# Patient Record
Sex: FEMALE | Race: WHITE | Employment: OTHER | URBAN - METROPOLITAN AREA
[De-identification: names, ages, dates, MRNs, and addresses within clinical notes are randomized per-mention and may not be internally consistent; named-entity substitution may affect disease eponyms.]

---

## 2020-06-16 ENCOUNTER — TRANSFERRED RECORDS (OUTPATIENT)
Dept: HEALTH INFORMATION MANAGEMENT | Facility: CLINIC | Age: 25
End: 2020-06-16

## 2020-06-16 LAB
HBV SURFACE AG SERPL QL IA: NEGATIVE
HIV 1+2 AB+HIV1 P24 AG SERPL QL IA: NON REACTIVE
RUBELLA ANTIBODY IGG QUANTITATIVE: NORMAL IU/ML
TREPONEMA ANTIBODIES: NON REACTIVE

## 2020-06-18 ENCOUNTER — MEDICAL CORRESPONDENCE (OUTPATIENT)
Dept: HEALTH INFORMATION MANAGEMENT | Facility: CLINIC | Age: 25
End: 2020-06-18

## 2020-06-18 ENCOUNTER — TRANSCRIBE ORDERS (OUTPATIENT)
Dept: MATERNAL FETAL MEDICINE | Facility: CLINIC | Age: 25
End: 2020-06-18

## 2020-06-18 DIAGNOSIS — O26.90 PREGNANCY RELATED CONDITION: Primary | ICD-10-CM

## 2020-06-19 ENCOUNTER — TELEPHONE (OUTPATIENT)
Dept: MATERNAL FETAL MEDICINE | Facility: CLINIC | Age: 25
End: 2020-06-19

## 2020-06-19 DIAGNOSIS — O09.291 HX OF PREECLAMPSIA, PRIOR PREGNANCY, CURRENTLY PREGNANT, FIRST TRIMESTER: ICD-10-CM

## 2020-06-19 DIAGNOSIS — O09.291 HX OF CERCLAGE, CURRENTLY PREGNANT, FIRST TRIMESTER: Primary | ICD-10-CM

## 2020-06-19 DIAGNOSIS — Z98.890 HX OF CERCLAGE, CURRENTLY PREGNANT, FIRST TRIMESTER: Primary | ICD-10-CM

## 2020-06-19 NOTE — TELEPHONE ENCOUNTER
Call to Paulie regarding referral to M. Previous hx in Care Everywhere. Wolfgang name Navya. Will have charts merged.    Sully Peres RN

## 2020-07-07 ENCOUNTER — OFFICE VISIT (OUTPATIENT)
Dept: MATERNAL FETAL MEDICINE | Facility: CLINIC | Age: 25
End: 2020-07-07
Attending: OBSTETRICS & GYNECOLOGY
Payer: COMMERCIAL

## 2020-07-07 ENCOUNTER — HOSPITAL ENCOUNTER (OUTPATIENT)
Dept: LAB | Facility: CLINIC | Age: 25
End: 2020-07-07
Attending: OBSTETRICS & GYNECOLOGY
Payer: COMMERCIAL

## 2020-07-07 ENCOUNTER — PREP FOR PROCEDURE (OUTPATIENT)
Dept: MATERNAL FETAL MEDICINE | Facility: CLINIC | Age: 25
End: 2020-07-07

## 2020-07-07 ENCOUNTER — HOSPITAL ENCOUNTER (OUTPATIENT)
Dept: ULTRASOUND IMAGING | Facility: CLINIC | Age: 25
End: 2020-07-07
Attending: OBSTETRICS & GYNECOLOGY
Payer: COMMERCIAL

## 2020-07-07 DIAGNOSIS — O09.291 HX OF CERCLAGE, CURRENTLY PREGNANT, FIRST TRIMESTER: ICD-10-CM

## 2020-07-07 DIAGNOSIS — O09.291 HX OF INCOMPETENT CERVIX, CURRENTLY PREGNANT, FIRST TRIMESTER: Primary | ICD-10-CM

## 2020-07-07 DIAGNOSIS — O09.291 HX OF PREECLAMPSIA, PRIOR PREGNANCY, CURRENTLY PREGNANT, FIRST TRIMESTER: ICD-10-CM

## 2020-07-07 DIAGNOSIS — Z36.9 PRENATAL SCREENING ENCOUNTER: ICD-10-CM

## 2020-07-07 DIAGNOSIS — Z11.59 ENCOUNTER FOR SCREENING FOR OTHER VIRAL DISEASES: ICD-10-CM

## 2020-07-07 DIAGNOSIS — Z98.890 HX OF CERCLAGE, CURRENTLY PREGNANT, FIRST TRIMESTER: ICD-10-CM

## 2020-07-07 DIAGNOSIS — Z36.9 PRENATAL SCREENING ENCOUNTER: Primary | ICD-10-CM

## 2020-07-07 DIAGNOSIS — O34.31 INCOMPETENT CERVIX IN PREGNANCY, ANTEPARTUM, FIRST TRIMESTER: Primary | ICD-10-CM

## 2020-07-07 PROCEDURE — 40000791 ZZHCL STATISTIC VERIFI PRENATAL TRISOMY 21,18,13: Performed by: OBSTETRICS & GYNECOLOGY

## 2020-07-07 PROCEDURE — 36415 COLL VENOUS BLD VENIPUNCTURE: CPT | Performed by: OBSTETRICS & GYNECOLOGY

## 2020-07-07 PROCEDURE — 96040 ZZH GENETIC COUNSELING, EACH 30 MINUTES: CPT | Mod: ZF | Performed by: GENETIC COUNSELOR, MS

## 2020-07-07 PROCEDURE — 76813 OB US NUCHAL MEAS 1 GEST: CPT

## 2020-07-07 NOTE — PROGRESS NOTES
"Maternal Fetal Medicine Consultation    I was asked by Dr. Puja Downing to see Paulie Careyjosiane for a consult for presumed incompetent cervix.     The patient is a 25 yo  at 11w4d. In 2016 she was noted to have a short cervix at a 20 week ultrasound. The cervix ultimately was 5 mm long and she was scheduled for a cerclage. At the time of the cerclage she was 2 cm dilated with bulging membranes at the external os. She subsequently delivered at 37 weeks by IOL for preeclampsia. She did not require and anti-hypertensive after delivery.     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 She has had not other surgeries. She has no other medical illnesses. No h/o STDs. No history of cervical surgery.    The patient history suggest the diagnosis of incompetent cervix. She was offered the option of prophylactic cerclage at 13-14 weeks or serial cervical length screening with a cerclage reserved for cervical shortening. The patient strongly desires a prophylactic cerclage and we will schedule this at 13-14 weeks. We would only recommend to assess the cervix after cerclage at the time of her anatomic survey. The patient is not a candidate for 17-OH-P.    For the history of preeclampsia with a term delivery we recommend to begin ASA 81 mg at 13 weeks. We also recommend more frequently office visits/BP assessments after 28 weeks.     Total time spent in face-to-face counseling was 15 minutes (>50% for medical management discussion and plan of care). A copy of this consult was sent to your office.    Please see \"Imaging\" tab under \"Chart Review\" for details of today's US at the [facility].    Star Terrell MD  Maternal-Fetal Medicine    "

## 2020-07-07 NOTE — PROGRESS NOTES
[facility]  Genetic Counseling Consult    Patient: Dulce Maria Mello YOB: 1995   Date of Service: 20      Dulce Maria Mello was seen at [facility] for genetic consultation to discuss the options for routine screening for fetal chromosome abnormalities.       Impression/Plan:   1.  Dulce Maria had an ultrasound and blood draw for NIPT (Innatal test through [facility]).Dulce Maria initially consented for first trimester screening, however, the NT was unattainable and proceeded with NIPT instead. Results are expected within 7-10 days, and will be available in [employer].  We will contact her to discuss the results, and a copy will be forwarded to the office of the referring OB provider. Dulce Maria Mello provided verbal permission for results to be left on her voicemail. She requested the results do include the sex.     2. Maternal serum AFP (single marker screen) is recommended after 15 weeks to screen for open neural tube defects. A quad screen should not be performed.    3. Dulce Maria had a Maternal Fetal Medicine consultation with Dr. Terrell regarding recommendations for a prophylactic cerclage. Please see the consultation note for more details     Pregnancy History:   /Parity:     Age at Delivery: 25 year old  LIAT: 2021, by Last Menstrual Period  Gestational Age: 11w4d    No significant complications or exposures were reported in the current pregnancy.    Dulce Maria serrano pregnancy history is significant for one full term pregnancy with cervical dilation at 20 weeks requiring a rescue cerclage at 22w and delivery at 37 weeks by induction     Medical History:   Dulce Maria serrano reported medical history is not expected to impact pregnancy management or risks to fetal development.       Family History:   A three-generation pedigree was obtained, and is scanned under the  Media  tab.   The following significant  "findings were reported by Dulce Maria:    The father of the pregnancy, Justice, 26, is healthy.       Dulce Maria's paternal grandmother  in her 40s, possibly due to cancer, with the type being unknown. Without more information it is difficult to offer an accurate risk assessment. However, reassuringly, the remaining family history is negative for cancer.       Justice's brother, 18, was born with normal hearing but had progressive hearing loss onset at age 2. Per Dulce Maria's report, Justice also had numerous ear infections with complications. His brother is otherwise healthy with normal vision. Dulce Maria believes he had a genetic work-up and although \"they did not find an answer they think Justice's parents were both carriers for something\". We discussed Justice could therefore be a carrier for the same condition. Dulce Maria's family history is negative for hearing loss, therefore, it unlikely she is a carrier for a similar condition. Carrier screening for genes/conditions like GJB2/GJB6 was offered and declined. We also discussed the  screening hearing test.     We reviewed that hearing loss is relatively common in the human population in that profound congenital hearing loss is estimated to occur in about 1 in 1000 births.  Approximately half of cases are thought to be due to environmental factors (acoustic trauma, ototoxic drug exposure, and bacterial or viral infections such as rubella or cytomegalovirus) and half due to genetic causes. The majority (70%) of cases of congenital deafness associated with genetic factors are classified as non-syndromic (the deafness is not associated with other clinical findings that define a recognized syndrome). In the remaining 30%, one of more than 400 forms of syndromic deafness can be diagnosed because of other associated clinical findings. It is difficult to determine the recurrence risk for family members without a known cause for the hearing loss. Inheritance patterns of " genetic hearing loss can include AD, AR, X-linked and mitochondrial.      Otherwise, the reported family history is negative for multiple miscarriages, stillbirths, birth defects, mental retardation, known genetic conditions, and consanguinity.       Carrier Screening:   The patient reports that the father of the pregnancy has  ancestry:      Cystic fibrosis is an autosomal recessive genetic condition that occurs with increased frequency in individuals of  ancestry and carrier screening for this condition is available.  In addition,  screening in the [facility] includes cystic fibrosis.    The patient reports that she is of [place] ancestry:     She is not aware of any Ashkenazi Scientologist heritage     Expanded carrier screening for mutations in a large panel of genes associated with autosomal recessive conditions including cystic fibrosis, spinal muscular atrophy, and others, is now available.      The patient has declined the carrier screening options reviewed today.       Risk Assessment for Chromosome Conditions:   We explained that the risk for fetal chromosome abnormalities increases with maternal age. We discussed specific features of common chromosome abnormalities, including Down syndrome, trisomy 13, trisomy 18, and sex chromosome trisomies.      At age 25 at midtrimester, the risk to have a baby with Down syndrome is 1 in 1040.    At age 25 at midtrimester, the risk to have a baby with any chromosome abnormality is 1 in 520.     Testing Options:   We discussed the following options:   First trimester screening    First trimester ultrasound with nuchal translucency and nasal bone assessments, maternal plasma hCG, MATA-A, and AFP measurement    Screens for fetal trisomy 21, trisomy 13, and trisomy 18    Cannot screen for open neural tube defects; maternal serum AFP after 15 weeks is recommended     Non-invasive Prenatal Testing (NIPT)    Maternal plasma cell-free DNA testing;  first trimester ultrasound with nuchal translucency and nasal bone assessment is recommended, when appropriate    Screens for fetal trisomy 21, trisomy 13, trisomy 18, and sex chromosome aneuploidy    Cannot screen for open neural tube defects; maternal serum AFP after 15 weeks is recommended     Chorionic villus sampling (CVS)    Invasive procedure typically performed in the first trimester by which placental villi are obtained for the purpose of chromosome analysis and/or other prenatal genetic analysis    Diagnostic results; >99% sensitivity for fetal chromosome abnormalities    Cannot test for open neural tube defects; maternal serum AFP after 15 weeks is recommended     Genetic Amniocentesis    Invasive procedure typically performed in the second trimester by which amniotic fluid is obtained for the purpose of chromosome analysis and/or other prenatal genetic analysis    Diagnostic results; >99% sensitivity for fetal chromosome abnormalities    AFAFP measurement tests for open neural tube defects     Comprehensive (Level II) ultrasound: Detailed ultrasound performed between 18-22 weeks gestation to screen for major birth defects and markers for aneuploidy.      We reviewed the benefits and limitations of this testing.  Screening tests provide a risk assessment specific to the pregnancy for certain fetal chromosome abnormalities, but cannot definitively diagnose or exclude a fetal chromosome abnormality.  Follow-up genetic counseling and consideration of diagnostic testing is recommended with any abnormal screening result.      It was a pleasure to be involved with Dulce Maria s care. Face-to-face time of the meeting was 45 minutes.  Sheyla Casas MS, [facility]  Licensed Genetic Counselor   [facility]  Maternal Fetal Medicine  [email or web address]  [email or web address]  Office: [phone]  Pager [phone]  MFM: [phone]   Fax: [phone]

## 2020-07-08 ENCOUNTER — TELEPHONE (OUTPATIENT)
Dept: MATERNAL FETAL MEDICINE | Facility: CLINIC | Age: 25
End: 2020-07-08

## 2020-07-08 PROBLEM — O09.291: Status: ACTIVE | Noted: 2020-07-08

## 2020-07-08 NOTE — TELEPHONE ENCOUNTER
Phone call to Dulce Maria regarding cerclage scheduled for 7/21/20 at 0830 at [facility]. Pt aware she needs to arrive by 0630. Address for [facility], parking, preparing for your cerclage info sheet, covid testing info, preparing for surgery form, and NPO status all reviewed via phone and emailed to patient. Order placed for covid test.       Sully Peres RN

## 2020-07-13 ENCOUNTER — TELEPHONE (OUTPATIENT)
Dept: MATERNAL FETAL MEDICINE | Facility: CLINIC | Age: 25
End: 2020-07-13

## 2020-07-13 LAB — LAB SCANNED RESULT: NORMAL

## 2020-07-13 NOTE — TELEPHONE ENCOUNTER
Called and discussed normal NIPT results with Dulce Maria. Results indicate NO ANEUPLOIDY DETECTED for chromosomes 21, 18, 13, or sex chromosomes (XX). Sex was disclosed per patient's wishes.     This puts her current pregnancy at low risk for Down syndrome, trisomy 18, trisomy 13 and sex chromosome abnormalities. This test is reported to have the following sensitivities: Down syndrome- 99%, trisomy 18- 98%, and trisomy 13- 98%. Although these results are reassuring, this does not replace a standard chromosome analysis from a chorionic villus sampling or amniocentesis.     While this result is reassuring, it does not rule out all possible genetic conditions. There is not currently one single genetic test available that can assess for all possible genetic conditions.    Level II ultrasound is recommended, given AMA.     MSAFP is the appropriate second trimester screening test for open neural tube defects; the maternal quad screen is not recommended.    Her results will be faxed to her primary OB to review.    Dulce Maria is scheduled for a prophylactic cerclage on 07/21/2020.     Sheyla Casas MS, [facility]  Licensed Genetic Counselor   [facility]  Maternal Fetal Medicine  [email or web address]  [email or web address]  Office: [phone]  Pager [phone]  M: [phone]   Fax: [phone]

## 2020-07-17 NOTE — H&P
[facility] History and Physical      Dulce Maria Mello MRN# [id number]   Age: 25 year old YOB: 1995       HPI:  Ms. Dulce Maria Mello is a 25 year old  at 13w4d by LMP consistent with 8w4d US, who presents for scheduled history indicated cerclage.  In patient's prior pregnancy, cervix was noted to be 5 mm long on 20w US. She had a cerclage placed, and at the time of the procedure she was 2 cm dilated with bulging membranes. Subsequently patient delivered at 37 after IOL for preeclampsia.      She was seen in [facility] on . Patient was offered serial cervical length screenings or prophylactic cerclage. She opted for prophylactic cerclage at 13-14 weeks.    Today, she denies contractions, vaginal bleeding, and loss of fluid.       Pregnancy Complications:  - H/o preeclampsia, did not require anti-hypertensives at anytime  - H/o cervical insufficiency in prior pregnancy, requiring cerclage    Prenatal Labs:   Lab Results   Component Value Date    ABO O 2020    RH Pos 2020    AS Neg 2020    HGB 12.4 2020     COVID-19 negative    GBS Status:   No results found for: GBS     Ultrasounds  [facility] Nuchal Translucency 20:  Cardiac activity present.  Placenta posterior.  Amniotic fluid normal amount.  Impression: Sonographic biometry agrees with gestational age predicted by LMP. The nuchal translucency could not be assessed today due to fetal position.    OB History  G1:  at 37W, s/p cerclage placement for 5 mm cervix, 2 cm dialated with bulging membranes. 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 No pertinent family history.     SocHx: She denies any tobacco, alcohol, or other drug use during this pregnancy.    ROS:   She denies headache, blurry vision, chest pain, shortness of breath, RUQ pain, cough, sore throat, or extremity edema.    PE:  Vit:   Patient Vitals for the past 4 hrs:   BP Temp Temp src Pulse Resp Height Weight   20 0645 125/58 98.7  F (37.1  C) Oral 102 16 1.651 m (5' 5\") 97.5 kg (215 lb)      Gen: Well-appearing, NAD, comfortable   CV: Well perfused   Pulm: Non-labored breathing  Abd: Soft, gravid, non-tender  Ext: Trace LE edema b/l    Doptones: 150 bpm      Assessment/Plan  Dulce Maria Mello is a 25 year old  female at 13w4d by 8w4d US here for scheduled history indicated cerclage.     #Hx of shortened cervix  #Hx of 2nd trimester painless cervical dilation  #Hx of cerclage placement  - Discussed risks of a cerclage including but not limited to: bleeding (including placental), infection (including chorioamnionitis), damage to surrounding structures including but not limited to bowel, bladder, cervix, risk of PPROM, failure of cerclage to prevent pre-viable, clarence-viable or  birth, possible increased need for  delivery.  - Doptones before and after the procedure  - Surgical consent signed.   - Anesthesia alerted, patient NPO since midnight. Plan spinal. No plans for antibiotics unless membranes visualized.    The patient was discussed with Dr. Castillo who is in agreement with the treatment plan.    Leola Rogers, MS4  [phone]    Resident/Fellow Attestation   I, Trina Gipson, was present with the medical student who participated in the service and in the documentation of the note.  I have verified the history and personally performed the physical exam and medical decision making.  I " agree with the assessment and plan of care as documented in the note.      25 y.o.  at 13w4d here for history-indicated cerclage. H/o 5 mm cervix at 20 weeks in prior pregnancy with [place] cerclage placement. Was induced at 37 weeks for preeclampsia. Counseled on options and elects prophylactic cerclage this pregnancy. Plan spinal. Anesthesia consult. No abx. Doppler tones prior to and after procedure.    Trina Gipson MD  OB/GYN Resident, PGY-3  2020 9:17 AM     Physician Attestation   I, Rocco Castillo MD, saw this patient with the resident and agree with the resident/fellow's findings and plan of care as documented in the note.      I personally reviewed vital signs, medications, labs and imaging.    Key findings: In summary, Dulce Maria Mello is a  at 13w4d admitted for prophylactic cerclage placement.  Risks, benefits, alternatives and hopeful outcomes were reviewed with the patient.  Informed consent obtained and plan to proceed with prophylactic cerclage today.     Rocco Castillo MD  Date of Service (when I saw the patient): 20    Time Spent on this Encounter   I spent 30 minutes on the unit/floor managing the care of Dulce Maria Mello. Over 50% of my time was spent on the following:   - Counseling the patient and/or family regarding: prognosis, risks and benefits of treatment options, recommended follow-up and medical compliance  - Coordination of care with the: nurse and patient    In summary, Dulce Maria Mello is a  at 13w4d admitted for prophylactic cerclage placement.  Risks, benefits, alternatives and hopeful outcomes were reviewed with the patient.  Informed consent obtained and plan to proceed with prophylactic cerclage today.     Rocco Castillo MD

## 2020-07-18 ENCOUNTER — ANESTHESIA EVENT (OUTPATIENT)
Dept: OBGYN | Facility: CLINIC | Age: 25
End: 2020-07-18
Payer: COMMERCIAL

## 2020-07-18 NOTE — ANESTHESIA PREPROCEDURE EVALUATION
Anesthesia Pre-Procedure Evaluation    Patient: Dulce Maria Mello   MRN:     [id number] Gender:   female   Age:    25 year old :      1995        Preoperative Diagnosis: Hx of incompetent cervix, currently pregnant, first trimester [O09.291]   Procedure(s):  CERCLAGE, CERVIX, VAGINAL APPROACH     LABS:  CBC: No results found for: WBC, HGB, HCT, PLT  BMP: No results found for: NA, POTASSIUM, CHLORIDE, CO2, BUN, CR, GLC  COAGS: No results found for: PTT, INR, FIBR  POC: No results found for: BGM, HCG, HCGS  OTHER: No results found for: PH, LACT, A1C, RICHARD, PHOS, MAG, ALBUMIN, PROTTOTAL, ALT, AST, GGT, ALKPHOS, BILITOTAL, BILIDIRECT, LIPASE, AMYLASE, MILA, TSH, T4, T3, CRP, SED     Preop Vitals    BP Readings from Last 3 Encounters:   No data found for BP    Pulse Readings from Last 3 Encounters:   No data found for Pulse      Resp Readings from Last 3 Encounters:   No data found for Resp    SpO2 Readings from Last 3 Encounters:   No data found for SpO2      Temp Readings from Last 1 Encounters:   No data found for Temp    Ht Readings from Last 1 Encounters:   No data found for Ht      Wt Readings from Last 1 Encounters:   No data found for Wt    There is no height or weight on file to calculate BMI.     LDA:        Past Medical History:   Diagnosis Date     PCOS (polycystic ovarian syndrome)       Past Surgical History:   Procedure Laterality Date     CERCLAGE CERVICAL  2016      Allergies not on file     Anesthesia Evaluation     . Pt has had prior anesthetic. Type: Regional    No history of anesthetic complications          ROS/MED HX    ENT/Pulmonary:  - neg pulmonary ROS     Neurologic:  - neg neurologic ROS     Cardiovascular:  - neg cardiovascular ROS       METS/Exercise Tolerance:     Hematologic:  - neg hematologic  ROS       Musculoskeletal:  - neg musculoskeletal ROS       GI/Hepatic:  - neg GI/hepatic ROS       Renal/Genitourinary:     (+) Other Renal/ Genitourinary, Hx of incompetent  cervix      Endo:     (+) Other Endocrine Disorder PCOS.      Psychiatric:  - neg psychiatric ROS       Infectious Disease:         Malignancy:      - no malignancy   Other:    (+) Possibly pregnant                        PHYSICAL EXAM:   Mental Status/Neuro: A/A/O   Airway: Facies: Feasible  Mallampati: II  Mouth/Opening: Full  TM distance: > 6 cm  Neck ROM: Full   Respiratory: Auscultation: CTAB     Resp. Rate: Normal     Resp. Effort: Normal      CV: Rhythm: Regular  Heart: Normal Sounds   Comments:      Dental: Normal Dentition                Assessment:   ASA SCORE: 2      Smoking Status:  Non-Smoker/Unknown        Plan:   Anes. Type:  MAC; Spinal   Pre-Medication: None   Induction:  N/a   Airway: Native Airway   Access/Monitoring: PIV   Maintenance: N/a     Postop Plan:   Postop Pain: Regional  Postop Sedation/Airway: Not planned  Disposition: Inpatient/Admit     PONV Management:   Adult Risk Factors: Female, Non-Smoker   Prevention: Ondansetron                   Stacey Hairston MD

## 2020-07-19 DIAGNOSIS — Z11.59 ENCOUNTER FOR SCREENING FOR OTHER VIRAL DISEASES: ICD-10-CM

## 2020-07-19 LAB
LABORATORY COMMENT REPORT: NORMAL
SARS-COV-2 RNA SPEC QL NAA+PROBE: NEGATIVE
SARS-COV-2 RNA SPEC QL NAA+PROBE: NORMAL
SPECIMEN SOURCE: NORMAL
SPECIMEN SOURCE: NORMAL

## 2020-07-21 ENCOUNTER — HOSPITAL ENCOUNTER (OUTPATIENT)
Facility: CLINIC | Age: 25
End: 2020-07-21
Admitting: OBSTETRICS & GYNECOLOGY
Payer: COMMERCIAL

## 2020-07-21 ENCOUNTER — HOSPITAL ENCOUNTER (OUTPATIENT)
Facility: CLINIC | Age: 25
Discharge: HOME | End: 2020-07-21
Attending: OBSTETRICS & GYNECOLOGY | Admitting: OBSTETRICS & GYNECOLOGY
Payer: COMMERCIAL

## 2020-07-21 ENCOUNTER — ANESTHESIA (OUTPATIENT)
Dept: OBGYN | Facility: CLINIC | Age: 25
End: 2020-07-21
Payer: COMMERCIAL

## 2020-07-21 VITALS
HEART RATE: 86 BPM | OXYGEN SATURATION: 100 % | DIASTOLIC BLOOD PRESSURE: 73 MMHG | TEMPERATURE: 98.4 F | BODY MASS INDEX: 35.82 KG/M2 | SYSTOLIC BLOOD PRESSURE: 126 MMHG | HEIGHT: 65 IN | WEIGHT: 215 LBS | RESPIRATION RATE: 22 BRPM

## 2020-07-21 DIAGNOSIS — O09.291 HX OF INCOMPETENT CERVIX, CURRENTLY PREGNANT, FIRST TRIMESTER: ICD-10-CM

## 2020-07-21 PROBLEM — Z98.890 HISTORY OF CERVICAL CERCLAGE: Status: ACTIVE | Noted: 2020-07-21

## 2020-07-21 LAB
ERYTHROCYTE [DISTWIDTH] IN BLOOD BY AUTOMATED COUNT: 12.6 % (ref 10–15)
HCT VFR BLD AUTO: 36.8 % (ref 35–47)
HGB BLD-MCNC: 12.4 G/DL (ref 11.7–15.7)
MCH RBC QN AUTO: 30.3 PG (ref 26.5–33)
MCHC RBC AUTO-ENTMCNC: 33.7 G/DL (ref 31.5–36.5)
MCV RBC AUTO: 90 FL (ref 78–100)
PLATELET # BLD AUTO: 256 10E9/L (ref 150–450)
RBC # BLD AUTO: 4.09 10E12/L (ref 3.8–5.2)
WBC # BLD AUTO: 9.4 10E9/L (ref 4–11)

## 2020-07-21 PROCEDURE — 86850 RBC ANTIBODY SCREEN: CPT | Performed by: STUDENT IN AN ORGANIZED HEALTH CARE EDUCATION/TRAINING PROGRAM

## 2020-07-21 PROCEDURE — 25800030 ZZH RX IP 258 OP 636: Performed by: STUDENT IN AN ORGANIZED HEALTH CARE EDUCATION/TRAINING PROGRAM

## 2020-07-21 PROCEDURE — 25000128 H RX IP 250 OP 636: Performed by: STUDENT IN AN ORGANIZED HEALTH CARE EDUCATION/TRAINING PROGRAM

## 2020-07-21 PROCEDURE — 36000047 ZZH SURGERY LEVEL 1 EA 15 ADDTL MIN - UMMC: Performed by: OBSTETRICS & GYNECOLOGY

## 2020-07-21 PROCEDURE — 71000012 ZZH RECOVERY PHASE 1 LEVEL 1 FIRST HR: Performed by: OBSTETRICS & GYNECOLOGY

## 2020-07-21 PROCEDURE — 86900 BLOOD TYPING SEROLOGIC ABO: CPT | Performed by: STUDENT IN AN ORGANIZED HEALTH CARE EDUCATION/TRAINING PROGRAM

## 2020-07-21 PROCEDURE — 25000132 ZZH RX MED GY IP 250 OP 250 PS 637: Performed by: STUDENT IN AN ORGANIZED HEALTH CARE EDUCATION/TRAINING PROGRAM

## 2020-07-21 PROCEDURE — 36000045 ZZH SURGERY LEVEL 1 1ST 30 MIN - UMMC: Performed by: OBSTETRICS & GYNECOLOGY

## 2020-07-21 PROCEDURE — 37000008 ZZH ANESTHESIA TECHNICAL FEE, 1ST 30 MIN: Performed by: OBSTETRICS & GYNECOLOGY

## 2020-07-21 PROCEDURE — 36415 COLL VENOUS BLD VENIPUNCTURE: CPT | Performed by: STUDENT IN AN ORGANIZED HEALTH CARE EDUCATION/TRAINING PROGRAM

## 2020-07-21 PROCEDURE — 40000169 ZZH STATISTIC PRE-PROCEDURE ASSESSMENT I: Performed by: OBSTETRICS & GYNECOLOGY

## 2020-07-21 PROCEDURE — 86901 BLOOD TYPING SEROLOGIC RH(D): CPT | Performed by: STUDENT IN AN ORGANIZED HEALTH CARE EDUCATION/TRAINING PROGRAM

## 2020-07-21 PROCEDURE — 85027 COMPLETE CBC AUTOMATED: CPT | Performed by: STUDENT IN AN ORGANIZED HEALTH CARE EDUCATION/TRAINING PROGRAM

## 2020-07-21 PROCEDURE — 37000009 ZZH ANESTHESIA TECHNICAL FEE, EACH ADDTL 15 MIN: Performed by: OBSTETRICS & GYNECOLOGY

## 2020-07-21 PROCEDURE — 27210794 ZZH OR GENERAL SUPPLY STERILE: Performed by: OBSTETRICS & GYNECOLOGY

## 2020-07-21 RX ORDER — SODIUM CHLORIDE, SODIUM LACTATE, POTASSIUM CHLORIDE, CALCIUM CHLORIDE 600; 310; 30; 20 MG/100ML; MG/100ML; MG/100ML; MG/100ML
INJECTION, SOLUTION INTRAVENOUS
Status: DISCONTINUED
Start: 2020-07-21 | End: 2020-07-21 | Stop reason: HOSPADM

## 2020-07-21 RX ORDER — SODIUM CHLORIDE, SODIUM LACTATE, POTASSIUM CHLORIDE, CALCIUM CHLORIDE 600; 310; 30; 20 MG/100ML; MG/100ML; MG/100ML; MG/100ML
INJECTION, SOLUTION INTRAVENOUS CONTINUOUS PRN
Status: DISCONTINUED | OUTPATIENT
Start: 2020-07-21 | End: 2020-07-21

## 2020-07-21 RX ORDER — BUPIVACAINE HYDROCHLORIDE 7.5 MG/ML
INJECTION, SOLUTION INTRASPINAL PRN
Status: DISCONTINUED | OUTPATIENT
Start: 2020-07-21 | End: 2020-07-21

## 2020-07-21 RX ORDER — CITRIC ACID/SODIUM CITRATE 334-500MG
30 SOLUTION, ORAL ORAL ONCE
Status: COMPLETED | OUTPATIENT
Start: 2020-07-21 | End: 2020-07-21

## 2020-07-21 RX ORDER — SODIUM CHLORIDE, SODIUM LACTATE, POTASSIUM CHLORIDE, CALCIUM CHLORIDE 600; 310; 30; 20 MG/100ML; MG/100ML; MG/100ML; MG/100ML
INJECTION, SOLUTION INTRAVENOUS CONTINUOUS
Status: DISCONTINUED | OUTPATIENT
Start: 2020-07-21 | End: 2020-07-21 | Stop reason: HOSPADM

## 2020-07-21 RX ORDER — PRENATAL VIT/IRON FUM/FOLIC AC 27MG-0.8MG
1 TABLET ORAL DAILY
COMMUNITY

## 2020-07-21 RX ORDER — ONDANSETRON 2 MG/ML
4 INJECTION INTRAMUSCULAR; INTRAVENOUS EVERY 6 HOURS PRN
Status: DISCONTINUED | OUTPATIENT
Start: 2020-07-21 | End: 2020-07-21 | Stop reason: HOSPADM

## 2020-07-21 RX ORDER — ACETAMINOPHEN 325 MG/1
975 TABLET ORAL ONCE
Status: COMPLETED | OUTPATIENT
Start: 2020-07-21 | End: 2020-07-21

## 2020-07-21 RX ADMIN — BUPIVACAINE HYDROCHLORIDE IN DEXTROSE 1.3 ML: 7.5 INJECTION, SOLUTION SUBARACHNOID at 10:18

## 2020-07-21 RX ADMIN — SODIUM CHLORIDE, POTASSIUM CHLORIDE, SODIUM LACTATE AND CALCIUM CHLORIDE: 600; 310; 30; 20 INJECTION, SOLUTION INTRAVENOUS at 07:36

## 2020-07-21 RX ADMIN — SODIUM CITRATE AND CITRIC ACID MONOHYDRATE 30 ML: 500; 334 SOLUTION ORAL at 09:51

## 2020-07-21 RX ADMIN — SODIUM CHLORIDE, POTASSIUM CHLORIDE, SODIUM LACTATE AND CALCIUM CHLORIDE: 600; 310; 30; 20 INJECTION, SOLUTION INTRAVENOUS at 11:46

## 2020-07-21 RX ADMIN — ACETAMINOPHEN 975 MG: 325 TABLET, FILM COATED ORAL at 15:29

## 2020-07-21 RX ADMIN — SODIUM CHLORIDE, POTASSIUM CHLORIDE, SODIUM LACTATE AND CALCIUM CHLORIDE: 600; 310; 30; 20 INJECTION, SOLUTION INTRAVENOUS at 10:05

## 2020-07-21 ASSESSMENT — MIFFLIN-ST. JEOR: SCORE: 1721.11

## 2020-07-21 NOTE — ANESTHESIA CARE TRANSFER NOTE
Patient: Dulce Maria Riddering    Procedure(s):  CERCLAGE, CERVIX, VAGINAL APPROACH    Diagnosis: Hx of incompetent cervix, currently pregnant, first trimester [O09.291]  Diagnosis Additional Information: No value filed.    Anesthesia Type:   MAC, Spinal     Note:  Airway :Room Air  Patient transferred to:PACU  Comments: VSS. Breathing spontaneously at a regular rate with adequate tidal volumes and maintaining O2 sats on RA. Denies nausea or pain. No apparent complications from anesthesia.     Stacey Hairston MD [facility] pager [phone]  Anesthesia CA-2  Handoff Report: Identifed the Patient, Identified the Reponsible Provider, Reviewed the pertinent medical history, Discussed the surgical course, Reviewed Intra-OP anesthesia mangement and issues during anesthesia, Set expectations for post-procedure period and Allowed opportunity for questions and acknowledgement of understanding      Vitals: (Last set prior to Anesthesia Care Transfer)    CRNA VITALS  7/21/2020 1040 - 7/21/2020 1123      7/21/2020             Pulse:  94    SpO2:  100 %                Electronically Signed By: Stacey Hairston MD  July 21, 2020  11:23 AM

## 2020-07-21 NOTE — OR NURSING
Post Op Cerclage Note  Data:  Cerclage performed under spinal anesthesia.  2 stitches placed.  FHT's 160's, no contrations present.  minimal bleeding seen.  No LOF noted.  Interventions:  Continuous monitoring.  Continue IVF.    Plan:  Observe for contractions, bleeding, LOF.  Monitor fetal status.  Notify care provider of changes in condition.

## 2020-07-21 NOTE — ANESTHESIA POSTPROCEDURE EVALUATION
Anesthesia POST Procedure Evaluation    Patient: Dulce Maria Mello   MRN:     [id number] Gender:   female   Age:    25 year old :      1995        Preoperative Diagnosis: Hx of incompetent cervix, currently pregnant, first trimester [O09.291]   Procedure(s):  CERCLAGE, CERVIX, VAGINAL APPROACH   Postop Comments: No value filed.     Anesthesia Type: MAC, Spinal       Disposition: Outpatient   Postop Pain Control: Uneventful            Sign Out: Well controlled pain   PONV: No   Neuro/Psych: Uneventful            Sign Out: Acceptable/Baseline neuro status   Airway/Respiratory: Uneventful            Sign Out: Acceptable/Baseline resp. status   CV/Hemodynamics: Uneventful            Sign Out: Acceptable CV status   Other NRE: NONE   DID A NON-ROUTINE EVENT OCCUR? No         Last Anesthesia Record Vitals:  CRNA VITALS  2020 1040 - 2020 1140      2020             Resp Rate (observed):  17          Last PACU Vitals:  Vitals Value Taken Time   /73 2020 12:15 PM   Temp 36.9  C (98.4  F) 2020 11:15 AM   Pulse 86 2020 12:15 PM   Resp 22 2020 12:15 PM   SpO2 100 % 2020 12:16 PM   Temp src     NIBP     Pulse     SpO2     Resp     Temp     Ht Rate     Temp 2     Vitals shown include unvalidated device data.      Electronically Signed By: El Gonzalez MD, 2020, 1:01 PM

## 2020-07-21 NOTE — PROGRESS NOTES
"Post-Op Check      Dulce Maria Mello is a 25 year old  F, POD#0 s/p Hutchinson cervical cerclage    S: Pt doing well with pain well controlled with Tylenol. Mild HA. Denies any nausea, shortness of breath and chest pain. She is voiding without issue. Ambulating, tolerating regular diet. She felt a \"pop\" of some kind a while ago, says it felt kind of like when she would feel fetal movement in her first pregnancy. Also says may have been gas. Has not felt anything similar since then. No leaking fluid or abnormal discharge.     O:    /73   Pulse 86   Temp 98.4  F (36.9  C) (Oral)   Resp 22   Ht 1.651 m (5' 5\")   Wt 97.5 kg (215 lb)   LMP 2020   SpO2 100%   BMI 35.78 kg/m      I/O last 3 completed shifts:  In: 700 [I.V.:700]  Out: 50 [Urine:50]    General:  A&Ox3, NAD  CV:  RRR  Pulm:  nonlabored breathing   Abd: soft, appropriately tender to palpation, nondistended.  No rebound or guarding  : Cerclage in place with knots at 12 and 1 o'clock. No tension on stitch. Cervix visually closed.   LE: no edema, no calf tenderness    BSUS: grossly normal fluid around fetus, fetal movement and appropriate FHR noted.     A/P:  25 year old F, POD#0 s/p Hutchinson cervical cerclage, 2 sutures (knot at 12 o'clock and 1 o'clock position).   1. FEN: re diet  2. Pain: tylenol prn  3. CV: No issues. Currently stable.   4. Pulm: No issues.  5. Heme: Hgb 12.4>EBL 5 ml.   6. GI: tolerating regular diet, no issues  7. : s/p gutierrez, voiding.   8. ID: Currently afebrile.   9. Endocrine: No issues.  10. Psych/Neuro: no issues   11. Prophylaxis: ambulation  12. Dispo: home this afternoon/evening.    Darleen Chowdhury MD  PGY3, OB/Gyn  2020, 4:43 PM  "

## 2020-07-21 NOTE — ANESTHESIA PROCEDURE NOTES
Spinal/LP Procedure Note    Spinal Block  Staff -   Anesthesiologist:  El Gonzalez MD  Resident/Fellow: Stacey Hairston MD    Performed By: resident  Procedure performed by resident/CRNA in presence of a teaching physician.        Location: OB  Procedure Start/Stop Times:     patient identified, IV checked, site marked, risks and benefits discussed, informed consent, monitors and equipment checked, pre-op evaluation, at physician/surgeon's request and post-op pain management      Correct Patient: Yes      Correct Position: Yes      Correct Site: Yes      Correct Procedure: Yes      Correct Laterality:  Yes    Site Marked:  Yes  Procedure:     Procedure:  Intrathecal    ASA:  2    Position:  Sitting    Sterile Prep: chloraprep      Insertion site:  L3-4    Approach:  Midline    Needle Type:  Kenzie    Needle gauge (G):  25    Local Skin Infiltration:  1% lidocaine    amount (ml):  5    Needle Length (in):  3.5    Introducer used: Yes      Attempts:  2    Redirects:  1    CSF:  Clear    Paresthesias:  No  Assessment/Narrative:      Initial attempt left of midline, patient had a vasovagal episode during the first attempt. Staff assisted her to lay down in a controlled manor. She was able to sit up for a second attempt. Confident that there was only a single dural puncture with the spinal needle.

## 2020-07-21 NOTE — OP NOTE
Operative Note: Cervical Cerclage         Pre-Op Diagnosis:   1) Single intrauterine pregnancy at 13w4d by 8w4d US  2) History of cervical insufficiency with Hutchinson cerclage in prior pregnancy         Post-Op Diagnosis:   1) Same, s/p procedure below         Procedure:   1) Hutchinson cervical cerclage, 2 sutures (knot at 12 o'clock and 1 o'clock position)         Surgeons:   Attending: Rocco Castillo MD  Resident: Trina Gipson MD  Medical Student: Leola Rogers MS-4         Anesthesia:   Spinal          Estimated Blood Loss:   5 cc         Findings:   1) Cervix closed prior to the procedure with approximately 1.5 cm length. Closed following the procedure.  2) Hemostasis achieved  3) Clear urine at completion  4) Fetal heart tones confirmed by US following the procedure         Specimens:   1) None         Complications:   1) None apparent          History:     Dulce Maria Mello is a 25 year oldy.o.  at 13w4d by 8w4d US presenting for scheduled history-indicated cerclage. Risks, benefits, alternatives were discussed. Surgical consent was signed.         Details of Procedure:   After administration of spinal anesthesia the patient was placed in the dorsal lithotomy position and prepped and draped in the usual fashion. A weighted speculum was placed into the vagina and Kota retractors were used to visualize the cervix. The vagina and cervix were copiously cleansed with betadine solution. The bladder was drained.    A stay suture was placed at the external os in a circumferential fashion and used for traction. A circumferential suture of #2 Ethilon was placed in the usual fashion at the cervicovaginal reflection with knot tied at 1 o'clock position. The stitch was securely tied down. A second suture of #2 Ethilon was placed just distal to the first stitch at the 12 o'clock position. The second stitch was securely tied down. The stay suture was removed. Digital exam confirmed that the cervix was  closed.    The bladder was drained of clear urine and a rectal exam confirmed that no sutures were present in the rectum. The cervix and vaginal vault were inspected and noted to be free of injury and hemostatic.The instruments were removed from the vagina. She tolerated her spinal anesthesia well without incident. She was subsequently transferred to the recovery room in satisfactory condition.    Sponge and needle counts were correct at the close of the case x 2.    Dr. Castillo was present for the entire procedure.    Trina Gipson MD  OB/GYN Resident, PGY-3  7/21/2020 11:27 AM     Physician Attestation   I was present for the entire procedure of cervical cerclage placement.    Rocco Castillo  Date of Service (when I saw the patient): 07/21/20

## 2020-07-22 DIAGNOSIS — O09.291 HX OF INCOMPETENT CERVIX, CURRENTLY PREGNANT, FIRST TRIMESTER: Primary | ICD-10-CM

## 2020-07-22 LAB
ABO + RH BLD: NORMAL
ABO + RH BLD: NORMAL
BLD GP AB SCN SERPL QL: NORMAL
BLOOD BANK CMNT PATIENT-IMP: NORMAL
SPECIMEN EXP DATE BLD: NORMAL

## 2020-08-27 ENCOUNTER — HOSPITAL ENCOUNTER (OUTPATIENT)
Dept: ULTRASOUND IMAGING | Facility: CLINIC | Age: 25
End: 2020-08-27
Attending: OBSTETRICS & GYNECOLOGY
Payer: COMMERCIAL

## 2020-08-27 ENCOUNTER — OFFICE VISIT (OUTPATIENT)
Dept: MATERNAL FETAL MEDICINE | Facility: CLINIC | Age: 25
End: 2020-08-27
Attending: OBSTETRICS & GYNECOLOGY
Payer: COMMERCIAL

## 2020-08-27 DIAGNOSIS — O09.291 HX OF INCOMPETENT CERVIX, CURRENTLY PREGNANT, FIRST TRIMESTER: ICD-10-CM

## 2020-08-27 DIAGNOSIS — O09.292 H/O INCOMPETENT CERVIX, CURRENTLY PREGNANT, SECOND TRIMESTER: Primary | ICD-10-CM

## 2020-08-27 PROCEDURE — 76811 OB US DETAILED SNGL FETUS: CPT

## 2020-08-27 NOTE — PROGRESS NOTES
"Please see \"Imaging\" tab under \"Chart Review\" for details of today's US at the [facility].    Star Terrell MD  Maternal-Fetal Medicine      "

## 2020-11-22 ENCOUNTER — HEALTH MAINTENANCE LETTER (OUTPATIENT)
Age: 25
End: 2020-11-22

## 2020-12-23 LAB — GROUP B STREP PCR: NEGATIVE

## 2021-01-03 ENCOUNTER — HOSPITAL ENCOUNTER (INPATIENT)
Facility: CLINIC | Age: 26
LOS: 1 days | Discharge: HOME | End: 2021-01-04
Attending: OBSTETRICS & GYNECOLOGY | Admitting: OBSTETRICS & GYNECOLOGY
Payer: COMMERCIAL

## 2021-01-03 LAB
ABO + RH BLD: NORMAL
ABO + RH BLD: NORMAL
ALT SERPL W P-5'-P-CCNC: 11 U/L (ref 0–50)
ANION GAP SERPL CALCULATED.3IONS-SCNC: 7 MMOL/L (ref 3–14)
AST SERPL W P-5'-P-CCNC: 12 U/L (ref 0–45)
BUN SERPL-MCNC: 5 MG/DL (ref 7–30)
CALCIUM SERPL-MCNC: 9.2 MG/DL (ref 8.5–10.1)
CHLORIDE SERPL-SCNC: 109 MMOL/L (ref 94–109)
CO2 SERPL-SCNC: 21 MMOL/L (ref 20–32)
CREAT SERPL-MCNC: 0.56 MG/DL (ref 0.52–1.04)
CREAT UR-MCNC: 276 MG/DL
ERYTHROCYTE [DISTWIDTH] IN BLOOD BY AUTOMATED COUNT: 13.9 % (ref 10–15)
GFR SERPL CREATININE-BSD FRML MDRD: >90 ML/MIN/{1.73_M2}
GLUCOSE BLDC GLUCOMTR-MCNC: 150 MG/DL (ref 70–99)
GLUCOSE BLDC GLUCOMTR-MCNC: 81 MG/DL (ref 70–99)
GLUCOSE BLDC GLUCOMTR-MCNC: 98 MG/DL (ref 70–99)
GLUCOSE SERPL-MCNC: 96 MG/DL (ref 70–99)
HCT VFR BLD AUTO: 40.8 % (ref 35–47)
HGB BLD-MCNC: 13.3 G/DL (ref 11.7–15.7)
LABORATORY COMMENT REPORT: NORMAL
MCH RBC QN AUTO: 29.9 PG (ref 26.5–33)
MCHC RBC AUTO-ENTMCNC: 32.6 G/DL (ref 31.5–36.5)
MCV RBC AUTO: 92 FL (ref 78–100)
PLATELET # BLD AUTO: 233 10E9/L (ref 150–450)
POTASSIUM SERPL-SCNC: 3.9 MMOL/L (ref 3.4–5.3)
PROT UR-MCNC: 0.87 G/L
PROT/CREAT 24H UR: 0.31 G/G CR (ref 0–0.2)
RBC # BLD AUTO: 4.45 10E12/L (ref 3.8–5.2)
RUPTURE OF FETAL MEMBRANES BY ROM PLUS: NEGATIVE
SARS-COV-2 RNA SPEC QL NAA+PROBE: NEGATIVE
SODIUM SERPL-SCNC: 137 MMOL/L (ref 133–144)
SPECIMEN EXP DATE BLD: NORMAL
SPECIMEN SOURCE: NORMAL
WBC # BLD AUTO: 9.2 10E9/L (ref 4–11)

## 2021-01-03 PROCEDURE — 36415 COLL VENOUS BLD VENIPUNCTURE: CPT | Performed by: OBSTETRICS & GYNECOLOGY

## 2021-01-03 PROCEDURE — 84156 ASSAY OF PROTEIN URINE: CPT | Performed by: OBSTETRICS & GYNECOLOGY

## 2021-01-03 PROCEDURE — 120N000001 HC R&B MED SURG/OB

## 2021-01-03 PROCEDURE — 86900 BLOOD TYPING SEROLOGIC ABO: CPT | Performed by: OBSTETRICS & GYNECOLOGY

## 2021-01-03 PROCEDURE — 722N000001 HC LABOR CARE VAGINAL DELIVERY SINGLE

## 2021-01-03 PROCEDURE — 87635 SARS-COV-2 COVID-19 AMP PRB: CPT | Performed by: OBSTETRICS & GYNECOLOGY

## 2021-01-03 PROCEDURE — 85027 COMPLETE CBC AUTOMATED: CPT | Performed by: OBSTETRICS & GYNECOLOGY

## 2021-01-03 PROCEDURE — G0463 HOSPITAL OUTPT CLINIC VISIT: HCPCS | Mod: 25

## 2021-01-03 PROCEDURE — 86901 BLOOD TYPING SEROLOGIC RH(D): CPT | Performed by: OBSTETRICS & GYNECOLOGY

## 2021-01-03 PROCEDURE — 250N000013 HC RX MED GY IP 250 OP 250 PS 637: Performed by: OBSTETRICS & GYNECOLOGY

## 2021-01-03 PROCEDURE — 250N000009 HC RX 250: Performed by: OBSTETRICS & GYNECOLOGY

## 2021-01-03 PROCEDURE — 80048 BASIC METABOLIC PNL TOTAL CA: CPT | Performed by: OBSTETRICS & GYNECOLOGY

## 2021-01-03 PROCEDURE — 59025 FETAL NON-STRESS TEST: CPT

## 2021-01-03 PROCEDURE — 86780 TREPONEMA PALLIDUM: CPT | Performed by: OBSTETRICS & GYNECOLOGY

## 2021-01-03 PROCEDURE — 10907ZC DRAINAGE OF AMNIOTIC FLUID, THERAPEUTIC FROM PRODUCTS OF CONCEPTION, VIA NATURAL OR ARTIFICIAL OPENING: ICD-10-PCS | Performed by: OBSTETRICS & GYNECOLOGY

## 2021-01-03 PROCEDURE — 999N001017 HC STATISTIC GLUCOSE BY METER IP

## 2021-01-03 PROCEDURE — 84112 EVAL AMNIOTIC FLUID PROTEIN: CPT | Performed by: OBSTETRICS & GYNECOLOGY

## 2021-01-03 PROCEDURE — 84450 TRANSFERASE (AST) (SGOT): CPT | Performed by: OBSTETRICS & GYNECOLOGY

## 2021-01-03 PROCEDURE — 84460 ALANINE AMINO (ALT) (SGPT): CPT | Performed by: OBSTETRICS & GYNECOLOGY

## 2021-01-03 RX ORDER — TRANEXAMIC ACID 10 MG/ML
1 INJECTION, SOLUTION INTRAVENOUS EVERY 30 MIN PRN
Status: DISCONTINUED | OUTPATIENT
Start: 2021-01-03 | End: 2021-01-04 | Stop reason: HOSPADM

## 2021-01-03 RX ORDER — PRENATAL VIT/IRON FUM/FOLIC AC 27MG-0.8MG
1 TABLET ORAL DAILY
Status: DISCONTINUED | OUTPATIENT
Start: 2021-01-04 | End: 2021-01-04 | Stop reason: HOSPADM

## 2021-01-03 RX ORDER — NALOXONE HYDROCHLORIDE 0.4 MG/ML
0.4 INJECTION, SOLUTION INTRAMUSCULAR; INTRAVENOUS; SUBCUTANEOUS
Status: DISCONTINUED | OUTPATIENT
Start: 2021-01-03 | End: 2021-01-04 | Stop reason: HOSPADM

## 2021-01-03 RX ORDER — OXYTOCIN/0.9 % SODIUM CHLORIDE 30/500 ML
100-340 PLASTIC BAG, INJECTION (ML) INTRAVENOUS CONTINUOUS PRN
Status: COMPLETED | OUTPATIENT
Start: 2021-01-03 | End: 2021-01-03

## 2021-01-03 RX ORDER — ONDANSETRON 2 MG/ML
4 INJECTION INTRAMUSCULAR; INTRAVENOUS EVERY 6 HOURS PRN
Status: DISCONTINUED | OUTPATIENT
Start: 2021-01-03 | End: 2021-01-03

## 2021-01-03 RX ORDER — NALOXONE HYDROCHLORIDE 0.4 MG/ML
0.2 INJECTION, SOLUTION INTRAMUSCULAR; INTRAVENOUS; SUBCUTANEOUS
Status: DISCONTINUED | OUTPATIENT
Start: 2021-01-03 | End: 2021-01-03

## 2021-01-03 RX ORDER — FENTANYL CITRATE 50 UG/ML
50-100 INJECTION, SOLUTION INTRAMUSCULAR; INTRAVENOUS
Status: DISCONTINUED | OUTPATIENT
Start: 2021-01-03 | End: 2021-01-03

## 2021-01-03 RX ORDER — SODIUM CHLORIDE, SODIUM LACTATE, POTASSIUM CHLORIDE, CALCIUM CHLORIDE 600; 310; 30; 20 MG/100ML; MG/100ML; MG/100ML; MG/100ML
INJECTION, SOLUTION INTRAVENOUS CONTINUOUS
Status: DISCONTINUED | OUTPATIENT
Start: 2021-01-03 | End: 2021-01-03

## 2021-01-03 RX ORDER — OXYTOCIN 10 [USP'U]/ML
10 INJECTION, SOLUTION INTRAMUSCULAR; INTRAVENOUS
Status: DISCONTINUED | OUTPATIENT
Start: 2021-01-03 | End: 2021-01-04 | Stop reason: HOSPADM

## 2021-01-03 RX ORDER — TRANEXAMIC ACID 10 MG/ML
1 INJECTION, SOLUTION INTRAVENOUS EVERY 30 MIN PRN
Status: DISCONTINUED | OUTPATIENT
Start: 2021-01-03 | End: 2021-01-03

## 2021-01-03 RX ORDER — IBUPROFEN 800 MG/1
800 TABLET, FILM COATED ORAL EVERY 6 HOURS PRN
Status: DISCONTINUED | OUTPATIENT
Start: 2021-01-03 | End: 2021-01-04 | Stop reason: HOSPADM

## 2021-01-03 RX ORDER — ACETAMINOPHEN 325 MG/1
650 TABLET ORAL EVERY 4 HOURS PRN
Status: DISCONTINUED | OUTPATIENT
Start: 2021-01-03 | End: 2021-01-04 | Stop reason: HOSPADM

## 2021-01-03 RX ORDER — NALOXONE HYDROCHLORIDE 0.4 MG/ML
0.2 INJECTION, SOLUTION INTRAMUSCULAR; INTRAVENOUS; SUBCUTANEOUS
Status: DISCONTINUED | OUTPATIENT
Start: 2021-01-03 | End: 2021-01-04 | Stop reason: HOSPADM

## 2021-01-03 RX ORDER — NALOXONE HYDROCHLORIDE 0.4 MG/ML
0.4 INJECTION, SOLUTION INTRAMUSCULAR; INTRAVENOUS; SUBCUTANEOUS
Status: DISCONTINUED | OUTPATIENT
Start: 2021-01-03 | End: 2021-01-03

## 2021-01-03 RX ORDER — OXYCODONE HYDROCHLORIDE 5 MG/1
5 TABLET ORAL EVERY 4 HOURS PRN
Status: DISCONTINUED | OUTPATIENT
Start: 2021-01-03 | End: 2021-01-04 | Stop reason: HOSPADM

## 2021-01-03 RX ORDER — OXYTOCIN/0.9 % SODIUM CHLORIDE 30/500 ML
100 PLASTIC BAG, INJECTION (ML) INTRAVENOUS CONTINUOUS
Status: DISCONTINUED | OUTPATIENT
Start: 2021-01-03 | End: 2021-01-04 | Stop reason: HOSPADM

## 2021-01-03 RX ORDER — NICOTINE POLACRILEX 4 MG
15-30 LOZENGE BUCCAL
Status: DISCONTINUED | OUTPATIENT
Start: 2021-01-03 | End: 2021-01-04 | Stop reason: HOSPADM

## 2021-01-03 RX ORDER — IBUPROFEN 800 MG/1
800 TABLET, FILM COATED ORAL
Status: DISCONTINUED | OUTPATIENT
Start: 2021-01-03 | End: 2021-01-03

## 2021-01-03 RX ORDER — ACETAMINOPHEN 325 MG/1
650 TABLET ORAL EVERY 4 HOURS PRN
Status: DISCONTINUED | OUTPATIENT
Start: 2021-01-03 | End: 2021-01-03

## 2021-01-03 RX ORDER — DEXTROSE MONOHYDRATE 25 G/50ML
25-50 INJECTION, SOLUTION INTRAVENOUS
Status: DISCONTINUED | OUTPATIENT
Start: 2021-01-03 | End: 2021-01-03

## 2021-01-03 RX ORDER — OXYCODONE AND ACETAMINOPHEN 5; 325 MG/1; MG/1
1 TABLET ORAL
Status: DISCONTINUED | OUTPATIENT
Start: 2021-01-03 | End: 2021-01-03

## 2021-01-03 RX ORDER — AMOXICILLIN 250 MG
1 CAPSULE ORAL 2 TIMES DAILY
Status: DISCONTINUED | OUTPATIENT
Start: 2021-01-03 | End: 2021-01-04 | Stop reason: HOSPADM

## 2021-01-03 RX ORDER — DEXTROSE MONOHYDRATE 25 G/50ML
25-50 INJECTION, SOLUTION INTRAVENOUS
Status: DISCONTINUED | OUTPATIENT
Start: 2021-01-03 | End: 2021-01-04 | Stop reason: HOSPADM

## 2021-01-03 RX ORDER — HYDROCORTISONE 2.5 %
CREAM (GRAM) TOPICAL 3 TIMES DAILY PRN
Status: DISCONTINUED | OUTPATIENT
Start: 2021-01-03 | End: 2021-01-04 | Stop reason: HOSPADM

## 2021-01-03 RX ORDER — SODIUM CHLORIDE 9 MG/ML
INJECTION, SOLUTION INTRAVENOUS CONTINUOUS
Status: DISCONTINUED | OUTPATIENT
Start: 2021-01-03 | End: 2021-01-03

## 2021-01-03 RX ORDER — NICOTINE POLACRILEX 4 MG
15-30 LOZENGE BUCCAL
Status: DISCONTINUED | OUTPATIENT
Start: 2021-01-03 | End: 2021-01-03

## 2021-01-03 RX ORDER — METHYLERGONOVINE MALEATE 0.2 MG/ML
200 INJECTION INTRAVENOUS
Status: DISCONTINUED | OUTPATIENT
Start: 2021-01-03 | End: 2021-01-03

## 2021-01-03 RX ORDER — MODIFIED LANOLIN
OINTMENT (GRAM) TOPICAL
Status: DISCONTINUED | OUTPATIENT
Start: 2021-01-03 | End: 2021-01-04 | Stop reason: HOSPADM

## 2021-01-03 RX ORDER — OXYTOCIN/0.9 % SODIUM CHLORIDE 30/500 ML
340 PLASTIC BAG, INJECTION (ML) INTRAVENOUS CONTINUOUS PRN
Status: DISCONTINUED | OUTPATIENT
Start: 2021-01-03 | End: 2021-01-04 | Stop reason: HOSPADM

## 2021-01-03 RX ORDER — CARBOPROST TROMETHAMINE 250 UG/ML
250 INJECTION, SOLUTION INTRAMUSCULAR
Status: DISCONTINUED | OUTPATIENT
Start: 2021-01-03 | End: 2021-01-03

## 2021-01-03 RX ORDER — BISACODYL 10 MG
10 SUPPOSITORY, RECTAL RECTAL DAILY PRN
Status: DISCONTINUED | OUTPATIENT
Start: 2021-01-05 | End: 2021-01-04 | Stop reason: HOSPADM

## 2021-01-03 RX ORDER — OXYTOCIN 10 [USP'U]/ML
10 INJECTION, SOLUTION INTRAMUSCULAR; INTRAVENOUS
Status: DISCONTINUED | OUTPATIENT
Start: 2021-01-03 | End: 2021-01-03

## 2021-01-03 RX ORDER — AMOXICILLIN 250 MG
2 CAPSULE ORAL 2 TIMES DAILY
Status: DISCONTINUED | OUTPATIENT
Start: 2021-01-03 | End: 2021-01-04 | Stop reason: HOSPADM

## 2021-01-03 RX ADMIN — Medication 340 ML/HR: at 21:38

## 2021-01-03 RX ADMIN — IBUPROFEN 800 MG: 800 TABLET ORAL at 22:02

## 2021-01-03 SDOH — HEALTH STABILITY: MENTAL HEALTH: HOW OFTEN DO YOU HAVE A DRINK CONTAINING ALCOHOL?: NEVER

## 2021-01-03 ASSESSMENT — MIFFLIN-ST. JEOR: SCORE: 1789.15

## 2021-01-03 ASSESSMENT — ACTIVITIES OF DAILY LIVING (ADL): WALKING_OR_CLIMBING_STAIRS_DIFFICULTY: NO

## 2021-01-03 NOTE — PROVIDER NOTIFICATION
01/03/21 1104   Provider Notification   Provider Name/Title Dr ELADIO Downing    Method of Notification Phone   Request Evaluate - Remote   Notification Reason Patient Arrived;Status Update;Lab/Diagnostic Study;Membrane Status   Updated Dr Downing with the labs, BP, Urine, cerclage pain,  and patient stated she was having a bit more discharge. Orders received to Do a ROM + and SVE.

## 2021-01-03 NOTE — PROVIDER NOTIFICATION
01/03/21 1154   Provider Notification   Provider Name/Title Dr CIERRA Downing    Method of Notification Phone   Request Evaluate - Remote   Notification Reason SVE;Status Update;Lab/Diagnostic Study   Dr Downing updated with the negative ROM +. Also

## 2021-01-03 NOTE — PROVIDER NOTIFICATION
01/03/21 1200   Provider Notification   Provider Name/Title Dr Downing    Method of Notification Phone   Request Evaluate - Remote   Dr Downing called into the patients room and discussed the option to wait until tomorrow to remove the patient's cerclage, Patient was adamant that with the pain she is having that she would like it out today. Dr Downing will come in to remove the cerclage.

## 2021-01-03 NOTE — PLAN OF CARE
Data: Patient presented to [facility] at 1/3/2021  9:24 AM.  Reason for maternal/fetal assessment is hypertension disorder of pregnancy,epigastric pain, internal cerclage pain. Patient reports she woke up with a HA and her home BP was in the 140s .  Patient is a .  Prenatal record reviewed. Pregnancy has been  has been complicated by gestational diabetes, diet controlled, HX of previous preeclampsia and cerclage thus far. Patient states her clarence pad has been more wet the last few days.   Gestational Age 37w2d. VSS. Fetal movement present. Patient denies uterine contractions, vaginal bleeding, abdominal pain, nausea, vomiting, visual disturbances, significant edema. Support person is present.   Action: Verbal consent for EFM. Triage assessment completed. Bill of rights reviewed.  Response: Patient verbalized agreement with plan. Will contact Dr Puja Downing with update and further orders. Dr becerra ordered PIH labs and a UA for protein.

## 2021-01-03 NOTE — H&P
"No significant change in general health status based on examination of the patient, review of Nursing Admission Database and prenatal record.    EFW: 7lb    Dulce Maria Mello is a 25 year old  @ 37w2d with prophylactic cerclage placed at 14 weeks. She presents with pelvic pressure and elevated BPs at home. With first pregnancy, cerclage was removed at 36.6 and she was 5 cm, then induced for HTN. She had HA earlier today and has some intermittent sharp RUQ pains. Prenatal course complicated by A1GDM and gestational HTN in 3rd trimester.      /65   Temp 99.4  F (37.4  C) (Oral)   Resp 18   Ht 1.651 m (5' 5\")   Wt 104.3 kg (230 lb)   LMP 2020   BMI 38.27 kg/m     117/86  NAD  Abd Soft, NT  Ext NT, no edema  Dowell; Occasional q10+  FHT 140s, mod variability, no decels, +accels  SVE cervix feels closed, cerclage x 2 palpaed  ROM+ negative  ALT 11  AST 12    Pr/cr 0.31  GBS negative    CERCLAGE REMOVAL:   Graves speculum placed. Distal cerclage identified, grasped with uterine packing forceps and snipped and removed in its entirey. Proximal cerclage identified, grasped with uterine packing forcceps and stnipped and removed in its entirety    SVE after cerclage removal: /-2    A/P: 25 year old  @ 37w2d with cervical insufficiency at term  - Cerclage removed, advanced cervical dilation  - Mild proteinuria but does not meet criteria for diagnosis of pre-eclampsia. Patient is monitoring BPs at home  - Will monitor 2 hrs and recheck cervix. If not changing or sharon, discharge to home with labor precautions  - Patient has appt tomorrow in clinic    Puja Downing MD     "

## 2021-01-03 NOTE — PROVIDER NOTIFICATION
01/03/21 1330   Provider Notification   Provider Name/Title Dr CIERRA Downing    Method of Notification At Bedside   Request Evaluate in Person   Notification Reason Other (Comment);SVE;Status Update  (To remove the cerclage)   Dr. ELADIO Downing here to remove the cerclage. Patient placed in stirups to position for removal.

## 2021-01-04 VITALS
HEIGHT: 65 IN | WEIGHT: 230 LBS | TEMPERATURE: 98 F | SYSTOLIC BLOOD PRESSURE: 137 MMHG | RESPIRATION RATE: 18 BRPM | BODY MASS INDEX: 38.32 KG/M2 | HEART RATE: 90 BPM | DIASTOLIC BLOOD PRESSURE: 76 MMHG

## 2021-01-04 LAB
GLUCOSE BLDC GLUCOMTR-MCNC: 85 MG/DL (ref 70–99)
T PALLIDUM AB SER QL: NONREACTIVE

## 2021-01-04 PROCEDURE — 250N000013 HC RX MED GY IP 250 OP 250 PS 637: Performed by: OBSTETRICS & GYNECOLOGY

## 2021-01-04 PROCEDURE — 999N001017 HC STATISTIC GLUCOSE BY METER IP

## 2021-01-04 RX ORDER — MODIFIED LANOLIN
OINTMENT (GRAM) TOPICAL
COMMUNITY
Start: 2021-01-04

## 2021-01-04 RX ORDER — HYDROCORTISONE 2.5 %
CREAM (GRAM) TOPICAL 3 TIMES DAILY PRN
COMMUNITY
Start: 2021-01-04

## 2021-01-04 RX ORDER — ACETAMINOPHEN 325 MG/1
650 TABLET ORAL EVERY 4 HOURS PRN
COMMUNITY
Start: 2021-01-04

## 2021-01-04 RX ORDER — IBUPROFEN 800 MG/1
800 TABLET, FILM COATED ORAL EVERY 6 HOURS PRN
COMMUNITY
Start: 2021-01-04

## 2021-01-04 RX ORDER — AMOXICILLIN 250 MG
1 CAPSULE ORAL 2 TIMES DAILY
Status: ON HOLD | COMMUNITY
Start: 2021-01-04 | End: 2021-02-02

## 2021-01-04 RX ADMIN — IBUPROFEN 800 MG: 800 TABLET ORAL at 04:13

## 2021-01-04 NOTE — PLAN OF CARE
Down to NICU to see baby.  Data: Dulce Maria Mello transferred to room 449 via wheelchair at 0020. Action: Receiving unit notified of transfer: Yes. Patient and family notified of room change. Report given to Brina MCLAIN at 0020. Belongings sent to receiving unit. Accompanied by Registered Nurse. Oriented patient to surroundings. Call light within reach. ID bands double-checked with receiving RN.  Response: Patient tolerated transfer and is stable.

## 2021-01-04 NOTE — DISCHARGE INSTRUCTIONS

## 2021-01-04 NOTE — PROVIDER NOTIFICATION
01/03/21 2127   Provider Notification   Provider Name/Title Dr. Downing   Method of Notification At Bedside   Request Attend Delivery     Patient reports uncontrollable urge to push, SVE 10/100/+1. MD at bedside to attend delivery.

## 2021-01-04 NOTE — L&D DELIVERY NOTE
Delivery Date:  2021      DIAGNOSIS:  A 37-week gestation, cervical insufficiency, labor.      PROCEDURE:  Normal spontaneous vaginal delivery.      ANESTHESIA:  None.      ESTIMATED BLOOD LOSS:  50 mL.  There were no lacerations.      FINDINGS:  Live born female infant.  Weight was 6 pounds 14 ounces, Apgars were 7 and 8 at 1 and 5 minutes, respectively     HISTORY:  The patient is a 25-year-old  2, para 1 who presented at 37 and 2/7 weeks' gestation.  She had a history of a prophylactic cerclage placed at 14 weeks due to a history of cervical insufficiency.  She was noting increased pelvic pressure.  Her pregnancy was complicated by A1 gestational diabetes and gestational hypertension.  On presentation, her blood pressure was 100/65.  She was occasionally sharon.  Her cervix was closed.  Her preeclampsia labs were normal other than mild proteinuria.  Given her term status, the 2 cerclage stitches were removed and her cervix was subsequently 4 cm dilated, 90% at the -2 station.  At this time, she was observed on Labor and Delivery.  She made spontaneous progress to 7 cm dilation, at which time it was determined she was in early labor.  Amniotomy was then performed with clear fluid.  She made rapid progress to become complete.      DESCRIPTION OF PROCEDURE:  The patient pushed over a single contraction.  The fetal vertex delivered in the right occiput anterior presentation. There was no nuchal cord noted and the remainder of the body delivered spontaneously.  The infant was placed on the patient's abdomen where she was immediately vigorous and crying.  Cord clamping was delayed by 1 minute, at which time the cord was doubly clamped and cut by the father of the baby.  The placenta delivered spontaneously, was found to be intact with a 3-vessel cord.  The vagina and perineum were inspected and noted to be intact.  The patient tolerated the procedure without difficulty and she remained in her delivery  room in stable condition.  Sponge, lap and needle counts were correct.         SANTINO ESTRELLA MD             D: 2021   T: 2021   MT:       Name:     ALAYNA BUTLER   MRN:      [id number]        Account:        [id number]   :      1995        Delivery Date:  2021               Document: [id number]

## 2021-01-04 NOTE — LACTATION NOTE
This note was copied from a baby's chart.  Several attempts to visit patient today but she was in NICU.  Plan for follow up tomorrow if desired.

## 2021-01-04 NOTE — PLAN OF CARE
Vital signs stable. Pain managed with Ibuprofen as needed. Pt reports no headache, visual disturbances, RUQ/epigastric pain or nausea.  Reflexes 2+, no clonus.  Pt is ambulating well on her own and voiding without difficulty.  Pt would like to discharge tonight if baby is cleared to go home after 24 hour testing.  Baby is in the NICU so pt has been spending all of her time there but baby is expected to be discharged to the floor this afternoon.  Pt is breastfeeding baby - going well.  Pt seems to be bonding well with baby.      Update:  Baby will now remain in the NICU.  Pt has been in the NICU all day with baby.

## 2021-01-04 NOTE — PROVIDER NOTIFICATION
01/03/21 1846   Provider Notification   Provider Name/Title Dr. CIERRA Downing    Method of Notification Phone   Request Evaluate - Remote   Notification Reason SVE;Status Update;Uterine Activity;Labor Status   Dr Downing called and was updated with the SVE 6/80/-2 with a bulging bag. Contraction pattern and blood pressures.  said she would come in and assess the patient a AROM.

## 2021-01-04 NOTE — PROVIDER NOTIFICATION
01/04/21 1530   Provider Notification   Provider Name/Title Dr. ELAINE Downing   Method of Notification Phone   Request Evaluate-Remote   Notification Reason Status Update   notified of patient wanting to be discharged early evening. Ok with plan as long as patient sable.

## 2021-01-04 NOTE — PROGRESS NOTES
#1 Postpartum VD.  Prophylactic Cerclage removed 1/3/2021.    Pt states doing well.  Nursing.  Pain well controlled.    Afebrile VSS. GL WNL.  Voiding w/o problems.  Fundus firm. Light flow.  Passing flatus.  Ext; w/o edema or pain.    Normal postpartum course.    Plan routine postpartum care.  Pt desires discharge after 24 hours.  Inst given for home.  Virtual visit w [facility] in 2 weeks or prn if problems.

## 2021-01-04 NOTE — PLAN OF CARE
Patient doing well, VSS. Has been to NICU twice since delivery and is doing hand expression for infant. Patient educated on importance of self-care. Finger stick glucose 85 this AM, no further testing needed per orders.

## 2021-01-04 NOTE — PROGRESS NOTES
"Pt comfortable with irregular ctx  /78   Temp 98.8  F (37.1  C) (Oral)   Resp 18   Ht 1.651 m (5' 5\")   Wt 104.3 kg (230 lb)   LMP 2020   BMI 38.27 kg/m     FHT: 150s, mod variability, no decels, +accels  SVE 7/100/-2 AROM clear    A/P: 25 year old  @ 37w2d with cervical insufficiency, s/p cerclage removal, now progressing into labor  - Discussed epidural, IV narcotics and nitrous oxide, she is considering options  - Anticipate   - Pitocin prn    Puja Downing MD   "

## 2021-01-04 NOTE — PROVIDER NOTIFICATION
01/03/21 2117   Vaginal Exam   Method sterile exam per physician   Cervical Dilation (cm) 7-8   Cervical Effacement 100%   Fetal Station 0     Patient reports urge to push after void on the toilet. MD called to bedside to assess. SVE 7-8/100/0. MD will remain on unit for delivery.

## 2021-01-05 ENCOUNTER — LACTATION ENCOUNTER (OUTPATIENT)
Age: 26
End: 2021-01-05

## 2021-01-05 NOTE — LACTATION NOTE
This note was copied from a baby's chart.  LC visit.  Infant was latched upon entering the room and nursing on the second side.  No overall latch concerns, but infant does struggle at times to maintain a nutritive sucking pattern.  LC demonstrated techniques such as breast compressions, gentle stimulation, and frequent burping to help maintain a better suck.  Football hold was encouraged because infant still puffs her cheeks occasionally as she is still working on coordinating her suck, swallow, breath rhythm. Once changed to this position, she relaxed into the feeding pattern better.  Colostrum was easily expressed. Infant is also taking formula supplementation post nursing.  LC encouraged her to continue to do so for the next couple days as well as pump post feeds until her milk volumes increase and infant has a follow up weight check.   recommended limiting supplement volume to no more than 30 mL for the first few days and may wean off formula as her milk volumes increase.   reviewed typical  behaviors with borderline LPT infants and full term infants. She is preparing for discharge to home and felt confident with breastfeeding.  All questions answered. She has a pump already for home use.

## 2021-01-05 NOTE — PLAN OF CARE
Vitals stable. No complaints for patient. Denies pain, or need for prescription for pain meds. Ambulating to and from NICU. Spending all day with baby therefore wanting discharge this evening.  agreeable with not concerning assessment. Discharge education and AVS reviewed. Knows to call clinic with any concerning symptoms. Writer walked with patient and her belongings down to the NICU at 1800 where patient will stay till baby discharged home.

## 2021-02-01 ENCOUNTER — ANESTHESIA EVENT (OUTPATIENT)
Dept: SURGERY | Facility: CLINIC | Age: 26
End: 2021-02-01
Payer: COMMERCIAL

## 2021-02-01 RX ORDER — PHENAZOPYRIDINE HYDROCHLORIDE 200 MG/1
200 TABLET, FILM COATED ORAL ONCE
Status: CANCELLED | OUTPATIENT
Start: 2021-02-01 | End: 2021-02-01

## 2021-02-02 ENCOUNTER — HOSPITAL ENCOUNTER (OUTPATIENT)
Dept: ULTRASOUND IMAGING | Facility: CLINIC | Age: 26
End: 2021-02-02
Attending: OBSTETRICS & GYNECOLOGY | Admitting: OBSTETRICS & GYNECOLOGY
Payer: COMMERCIAL

## 2021-02-02 ENCOUNTER — ANESTHESIA (OUTPATIENT)
Dept: SURGERY | Facility: CLINIC | Age: 26
End: 2021-02-02
Payer: COMMERCIAL

## 2021-02-02 ENCOUNTER — HOSPITAL ENCOUNTER (OUTPATIENT)
Facility: CLINIC | Age: 26
Discharge: HOME | End: 2021-02-02
Attending: OBSTETRICS & GYNECOLOGY | Admitting: OBSTETRICS & GYNECOLOGY
Payer: COMMERCIAL

## 2021-02-02 VITALS
WEIGHT: 228 LBS | BODY MASS INDEX: 37.99 KG/M2 | DIASTOLIC BLOOD PRESSURE: 75 MMHG | SYSTOLIC BLOOD PRESSURE: 121 MMHG | RESPIRATION RATE: 12 BRPM | HEIGHT: 65 IN | TEMPERATURE: 97 F | HEART RATE: 75 BPM | OXYGEN SATURATION: 97 %

## 2021-02-02 LAB
ABO + RH BLD: NORMAL
ABO + RH BLD: NORMAL
B-HCG SERPL-ACNC: 12 IU/L (ref 0–5)
BLD GP AB SCN SERPL QL: NORMAL
BLOOD BANK CMNT PATIENT-IMP: NORMAL
GLUCOSE SERPL-MCNC: 92 MG/DL (ref 70–99)
HGB BLD-MCNC: 12.4 G/DL (ref 11.7–15.7)
LABORATORY COMMENT REPORT: NORMAL
SARS-COV-2 RNA RESP QL NAA+PROBE: NEGATIVE
SPECIMEN EXP DATE BLD: NORMAL
SPECIMEN SOURCE: NORMAL

## 2021-02-02 PROCEDURE — 999N001020 HC STATISTIC H-SEND OUTS PREP: Performed by: OBSTETRICS & GYNECOLOGY

## 2021-02-02 PROCEDURE — 999N000141 HC STATISTIC PRE-PROCEDURE NURSING ASSESSMENT: Performed by: OBSTETRICS & GYNECOLOGY

## 2021-02-02 PROCEDURE — 85018 HEMOGLOBIN: CPT | Performed by: OBSTETRICS & GYNECOLOGY

## 2021-02-02 PROCEDURE — 86901 BLOOD TYPING SEROLOGIC RH(D): CPT | Performed by: OBSTETRICS & GYNECOLOGY

## 2021-02-02 PROCEDURE — 82947 ASSAY GLUCOSE BLOOD QUANT: CPT | Performed by: OBSTETRICS & GYNECOLOGY

## 2021-02-02 PROCEDURE — 999N000063 US INTRAOPERATIVE: Mod: TC

## 2021-02-02 PROCEDURE — 86850 RBC ANTIBODY SCREEN: CPT | Performed by: OBSTETRICS & GYNECOLOGY

## 2021-02-02 PROCEDURE — 86900 BLOOD TYPING SEROLOGIC ABO: CPT | Performed by: OBSTETRICS & GYNECOLOGY

## 2021-02-02 PROCEDURE — 258N000003 HC RX IP 258 OP 636: Performed by: NURSE ANESTHETIST, CERTIFIED REGISTERED

## 2021-02-02 PROCEDURE — 250N000011 HC RX IP 250 OP 636: Performed by: NURSE ANESTHETIST, CERTIFIED REGISTERED

## 2021-02-02 PROCEDURE — 250N000009 HC RX 250: Performed by: NURSE ANESTHETIST, CERTIFIED REGISTERED

## 2021-02-02 PROCEDURE — 88305 TISSUE EXAM BY PATHOLOGIST: CPT | Mod: TC | Performed by: OBSTETRICS & GYNECOLOGY

## 2021-02-02 PROCEDURE — 87635 SARS-COV-2 COVID-19 AMP PRB: CPT | Performed by: OBSTETRICS & GYNECOLOGY

## 2021-02-02 PROCEDURE — 272N000001 HC OR GENERAL SUPPLY STERILE: Performed by: OBSTETRICS & GYNECOLOGY

## 2021-02-02 PROCEDURE — 36415 COLL VENOUS BLD VENIPUNCTURE: CPT | Performed by: OBSTETRICS & GYNECOLOGY

## 2021-02-02 PROCEDURE — 250N000011 HC RX IP 250 OP 636: Performed by: ANESTHESIOLOGY

## 2021-02-02 PROCEDURE — 710N000012 HC RECOVERY PHASE 2, PER MINUTE: Performed by: OBSTETRICS & GYNECOLOGY

## 2021-02-02 PROCEDURE — 360N000076 HC SURGERY LEVEL 3, PER MIN: Performed by: OBSTETRICS & GYNECOLOGY

## 2021-02-02 PROCEDURE — 250N000013 HC RX MED GY IP 250 OP 250 PS 637: Performed by: OBSTETRICS & GYNECOLOGY

## 2021-02-02 PROCEDURE — 88305 TISSUE EXAM BY PATHOLOGIST: CPT | Mod: 26 | Performed by: PATHOLOGY

## 2021-02-02 PROCEDURE — 250N000011 HC RX IP 250 OP 636: Performed by: OBSTETRICS & GYNECOLOGY

## 2021-02-02 PROCEDURE — 250N000025 HC SEVOFLURANE, PER MIN: Performed by: OBSTETRICS & GYNECOLOGY

## 2021-02-02 PROCEDURE — 710N000009 HC RECOVERY PHASE 1, LEVEL 1, PER MIN: Performed by: OBSTETRICS & GYNECOLOGY

## 2021-02-02 PROCEDURE — 370N000017 HC ANESTHESIA TECHNICAL FEE, PER MIN: Performed by: OBSTETRICS & GYNECOLOGY

## 2021-02-02 PROCEDURE — 84702 CHORIONIC GONADOTROPIN TEST: CPT | Performed by: OBSTETRICS & GYNECOLOGY

## 2021-02-02 RX ORDER — SODIUM CHLORIDE, SODIUM LACTATE, POTASSIUM CHLORIDE, CALCIUM CHLORIDE 600; 310; 30; 20 MG/100ML; MG/100ML; MG/100ML; MG/100ML
INJECTION, SOLUTION INTRAVENOUS CONTINUOUS PRN
Status: DISCONTINUED | OUTPATIENT
Start: 2021-02-02 | End: 2021-02-02

## 2021-02-02 RX ORDER — METHYLERGONOVINE MALEATE 0.2 MG/ML
200 INJECTION INTRAVENOUS ONCE
Status: DISCONTINUED | OUTPATIENT
Start: 2021-02-02 | End: 2021-02-02 | Stop reason: HOSPADM

## 2021-02-02 RX ORDER — FENTANYL CITRATE 50 UG/ML
25-50 INJECTION, SOLUTION INTRAMUSCULAR; INTRAVENOUS
Status: DISCONTINUED | OUTPATIENT
Start: 2021-02-02 | End: 2021-02-02 | Stop reason: HOSPADM

## 2021-02-02 RX ORDER — NALOXONE HYDROCHLORIDE 0.4 MG/ML
0.2 INJECTION, SOLUTION INTRAMUSCULAR; INTRAVENOUS; SUBCUTANEOUS
Status: DISCONTINUED | OUTPATIENT
Start: 2021-02-02 | End: 2021-02-02 | Stop reason: HOSPADM

## 2021-02-02 RX ORDER — ONDANSETRON 2 MG/ML
4 INJECTION INTRAMUSCULAR; INTRAVENOUS EVERY 30 MIN PRN
Status: DISCONTINUED | OUTPATIENT
Start: 2021-02-02 | End: 2021-02-02 | Stop reason: HOSPADM

## 2021-02-02 RX ORDER — CEFAZOLIN SODIUM 2 G/100ML
2 INJECTION, SOLUTION INTRAVENOUS
Status: COMPLETED | OUTPATIENT
Start: 2021-02-02 | End: 2021-02-02

## 2021-02-02 RX ORDER — OXYCODONE HYDROCHLORIDE 5 MG/1
5 TABLET ORAL
Status: DISCONTINUED | OUTPATIENT
Start: 2021-02-02 | End: 2021-02-02 | Stop reason: HOSPADM

## 2021-02-02 RX ORDER — NALOXONE HYDROCHLORIDE 0.4 MG/ML
0.4 INJECTION, SOLUTION INTRAMUSCULAR; INTRAVENOUS; SUBCUTANEOUS
Status: DISCONTINUED | OUTPATIENT
Start: 2021-02-02 | End: 2021-02-02 | Stop reason: HOSPADM

## 2021-02-02 RX ORDER — LIDOCAINE HYDROCHLORIDE 20 MG/ML
INJECTION, SOLUTION INFILTRATION; PERINEURAL PRN
Status: DISCONTINUED | OUTPATIENT
Start: 2021-02-02 | End: 2021-02-02

## 2021-02-02 RX ORDER — CEFAZOLIN SODIUM 1 G/3ML
1 INJECTION, POWDER, FOR SOLUTION INTRAMUSCULAR; INTRAVENOUS SEE ADMIN INSTRUCTIONS
Status: DISCONTINUED | OUTPATIENT
Start: 2021-02-02 | End: 2021-02-02

## 2021-02-02 RX ORDER — ONDANSETRON 4 MG/1
4 TABLET, ORALLY DISINTEGRATING ORAL EVERY 30 MIN PRN
Status: DISCONTINUED | OUTPATIENT
Start: 2021-02-02 | End: 2021-02-02 | Stop reason: HOSPADM

## 2021-02-02 RX ORDER — HYDROMORPHONE HYDROCHLORIDE 1 MG/ML
.3-.5 INJECTION, SOLUTION INTRAMUSCULAR; INTRAVENOUS; SUBCUTANEOUS EVERY 5 MIN PRN
Status: DISCONTINUED | OUTPATIENT
Start: 2021-02-02 | End: 2021-02-02 | Stop reason: HOSPADM

## 2021-02-02 RX ORDER — MISOPROSTOL 200 UG/1
TABLET ORAL PRN
Status: DISCONTINUED | OUTPATIENT
Start: 2021-02-02 | End: 2021-02-02 | Stop reason: HOSPADM

## 2021-02-02 RX ORDER — ONDANSETRON 2 MG/ML
INJECTION INTRAMUSCULAR; INTRAVENOUS PRN
Status: DISCONTINUED | OUTPATIENT
Start: 2021-02-02 | End: 2021-02-02

## 2021-02-02 RX ORDER — DEXAMETHASONE SODIUM PHOSPHATE 4 MG/ML
INJECTION, SOLUTION INTRA-ARTICULAR; INTRALESIONAL; INTRAMUSCULAR; INTRAVENOUS; SOFT TISSUE PRN
Status: DISCONTINUED | OUTPATIENT
Start: 2021-02-02 | End: 2021-02-02

## 2021-02-02 RX ORDER — PROPOFOL 10 MG/ML
INJECTION, EMULSION INTRAVENOUS CONTINUOUS PRN
Status: DISCONTINUED | OUTPATIENT
Start: 2021-02-02 | End: 2021-02-02

## 2021-02-02 RX ORDER — SODIUM CHLORIDE, SODIUM LACTATE, POTASSIUM CHLORIDE, CALCIUM CHLORIDE 600; 310; 30; 20 MG/100ML; MG/100ML; MG/100ML; MG/100ML
INJECTION, SOLUTION INTRAVENOUS CONTINUOUS
Status: DISCONTINUED | OUTPATIENT
Start: 2021-02-02 | End: 2021-02-02 | Stop reason: HOSPADM

## 2021-02-02 RX ORDER — IBUPROFEN 600 MG/1
600 TABLET, FILM COATED ORAL
Status: DISCONTINUED | OUTPATIENT
Start: 2021-02-02 | End: 2021-02-02 | Stop reason: HOSPADM

## 2021-02-02 RX ORDER — FENTANYL CITRATE 50 UG/ML
INJECTION, SOLUTION INTRAMUSCULAR; INTRAVENOUS PRN
Status: DISCONTINUED | OUTPATIENT
Start: 2021-02-02 | End: 2021-02-02

## 2021-02-02 RX ORDER — PROPOFOL 10 MG/ML
INJECTION, EMULSION INTRAVENOUS PRN
Status: DISCONTINUED | OUTPATIENT
Start: 2021-02-02 | End: 2021-02-02

## 2021-02-02 RX ADMIN — LIDOCAINE HYDROCHLORIDE 40 MG: 20 INJECTION, SOLUTION INFILTRATION; PERINEURAL at 07:37

## 2021-02-02 RX ADMIN — DEXMEDETOMIDINE HYDROCHLORIDE 8 MCG: 100 INJECTION, SOLUTION INTRAVENOUS at 08:24

## 2021-02-02 RX ADMIN — DEXAMETHASONE SODIUM PHOSPHATE 4 MG: 4 INJECTION, SOLUTION INTRA-ARTICULAR; INTRALESIONAL; INTRAMUSCULAR; INTRAVENOUS; SOFT TISSUE at 07:52

## 2021-02-02 RX ADMIN — DEXMEDETOMIDINE HYDROCHLORIDE 12 MCG: 100 INJECTION, SOLUTION INTRAVENOUS at 08:22

## 2021-02-02 RX ADMIN — PROPOFOL 40 MG: 10 INJECTION, EMULSION INTRAVENOUS at 07:43

## 2021-02-02 RX ADMIN — SODIUM CHLORIDE, POTASSIUM CHLORIDE, SODIUM LACTATE AND CALCIUM CHLORIDE: 600; 310; 30; 20 INJECTION, SOLUTION INTRAVENOUS at 07:37

## 2021-02-02 RX ADMIN — CEFAZOLIN SODIUM 2 G: 2 INJECTION, SOLUTION INTRAVENOUS at 07:52

## 2021-02-02 RX ADMIN — FENTANYL CITRATE 50 MCG: 50 INJECTION, SOLUTION INTRAMUSCULAR; INTRAVENOUS at 07:52

## 2021-02-02 RX ADMIN — FENTANYL CITRATE 50 MCG: 50 INJECTION, SOLUTION INTRAMUSCULAR; INTRAVENOUS at 07:37

## 2021-02-02 RX ADMIN — ONDANSETRON 4 MG: 2 INJECTION INTRAMUSCULAR; INTRAVENOUS at 07:52

## 2021-02-02 RX ADMIN — PROPOFOL 50 MG: 10 INJECTION, EMULSION INTRAVENOUS at 07:39

## 2021-02-02 RX ADMIN — PHENYLEPHRINE HYDROCHLORIDE 100 MCG: 10 INJECTION INTRAVENOUS at 08:10

## 2021-02-02 RX ADMIN — MIDAZOLAM 2 MG: 1 INJECTION INTRAMUSCULAR; INTRAVENOUS at 07:37

## 2021-02-02 RX ADMIN — FENTANYL CITRATE 50 MCG: 0.05 INJECTION, SOLUTION INTRAMUSCULAR; INTRAVENOUS at 08:48

## 2021-02-02 RX ADMIN — PROPOFOL 150 MCG/KG/MIN: 10 INJECTION, EMULSION INTRAVENOUS at 07:38

## 2021-02-02 RX ADMIN — TRANEXAMIC ACID 1 G: 1 INJECTION, SOLUTION INTRAVENOUS at 08:08

## 2021-02-02 RX ADMIN — PROPOFOL 50 MG: 10 INJECTION, EMULSION INTRAVENOUS at 07:47

## 2021-02-02 ASSESSMENT — LIFESTYLE VARIABLES: TOBACCO_USE: 0

## 2021-02-02 ASSESSMENT — MIFFLIN-ST. JEOR: SCORE: 1780.08

## 2021-02-02 NOTE — BRIEF OP NOTE
[facility]    Brief Operative Note    Pre-operative diagnosis: Retained products of conception with hemorrhage [O72.2]  Post-operative diagnosis Same as pre-operative diagnosis    Procedure: Procedure(s):  DILATION AND CURETTAGE, UTERUS, USING SUCTION, WITH ULTRASOUND GUIDANCE, HYSTEROSCOPY  Surgeon: Surgeon(s) and Role:     * Puja Downing MD - Primary  Anesthesia: General  Estimated blood loss: 40 cc  Drains: None  Specimens:   ID Type Source Tests Collected by Time Destination   A : RETAINED PRODUCTS OF CONCEPTION Products of Conception Placenta/ Fragments post Live Birth SURGICAL PATHOLOGY EXAM Puja Downing MD 2/2/2021  8:15 AM      Findings:   2 cm piece of placental tissue removed. TXA 1g and 600 mcg cytotec given..  Complications: None.  Implants: * No implants in log *

## 2021-02-02 NOTE — ANESTHESIA POSTPROCEDURE EVALUATION
Patient: Dulce Maria MONTAÑO Riddering    Procedure(s):  DILATION AND CURETTAGE, UTERUS, USING SUCTION, WITH ULTRASOUND GUIDANCE, HYSTEROSCOPY    Diagnosis:Retained products of conception with hemorrhage [O72.2]  Diagnosis Additional Information: No value filed.    Anesthesia Type:  General    Note:     Postop Pain Control: Uneventful            Sign Out: Well controlled pain   PONV: No   Neuro/Psych: Uneventful            Sign Out: Acceptable/Baseline neuro status   Airway/Respiratory: Uneventful            Sign Out: Acceptable/Baseline resp. status   CV/Hemodynamics: Uneventful            Sign Out: Acceptable CV status   Other NRE: NONE   DID A NON-ROUTINE EVENT OCCUR? No         Last vitals:  Vitals:    02/02/21 0829 02/02/21 0845 02/02/21 0900   BP: (!) 131/98 118/73 126/72   Pulse: 96 65 73   Resp: 27 16 14   Temp: 36.2  C (97.1  F)     SpO2: 100% 98% 94%       Electronically Signed By: Nolan Daugherty MD  February 2, 2021  9:46 AM

## 2021-02-02 NOTE — ANESTHESIA PROCEDURE NOTES
Airway    Patient location during procedure: OR  Staff -   Anesthesiologist:  Nolan Daugherty MD  CRNA: Jennifer Breaux APRN CRNA  Performed By: CRNA    Consent for Airway   Urgency: elective    Indications and Patient Condition  Indications for airway management: clarence-procedural  Mask difficulty assessment: 0 - not attempted    Final Airway Details  Final airway type: supraglottic airway    Endotracheal Airway Details   Secured with: plastic tape    Post intubation assessment   Placement verified by: capnometry, equal breath sounds and chest rise   Number of attempts at approach: 1  Secured with:plastic tape  Ease of procedure: easy  Dentition: Intact and Unchanged

## 2021-02-02 NOTE — OR NURSING
Patient up to commode, unable to void but did pass blood clot approximately size of leila. Patient reluctant to receive Methergine due to its affect on breast milk supply. Contacted  and pharmacist to discuss its effects.

## 2021-02-02 NOTE — ANESTHESIA PREPROCEDURE EVALUATION
Anesthesia Pre-Procedure Evaluation    Patient: Dulce Maria MONTAÑO Riddering   MRN: [id number] : 1995        Preoperative Diagnosis: Retained products of conception with hemorrhage [O72.2]   Procedure : Procedure(s):  DILATION AND CURETTAGE, UTERUS, USING SUCTION, WITH ULTRASOUND GUIDANCE  POSSIBLE MYOSURE MORCELLATOR     Past Medical History:   Diagnosis Date     Diabetes (H)     Gestational diet controlled     PCOS (polycystic ovarian syndrome)      Spinal headache     blood patch needed      Past Surgical History:   Procedure Laterality Date     CERCLAGE CERVICAL  2016     CERCLAGE CERVICAL N/A 2020    Procedure: CERCLAGE, CERVIX, VAGINAL APPROACH;  Surgeon: Rocco Castillo MD;  Location: [facility] L+D      No Known Allergies   Social History     Tobacco Use     Smoking status: Never Smoker     Smokeless tobacco: Never Used   Substance Use Topics     Alcohol use: Never     Frequency: Never      Wt Readings from Last 1 Encounters:   21 103.4 kg (228 lb)        Anesthesia Evaluation   Pt has had prior anesthetic.     History of anesthetic complications  - spinal headache.      ROS/MED HX  ENT/Pulmonary:    (-) tobacco use   Neurologic:       Cardiovascular:       METS/Exercise Tolerance:     Hematologic:       Musculoskeletal:       GI/Hepatic:       Renal/Genitourinary:       Endo: Comment: PCOS    (+) Obesity,  Type II DM: Gestational.   Psychiatric/Substance Use:       Infectious Disease:       Malignancy:       Other:               OUTSIDE LABS:  CBC:   Lab Results   Component Value Date    WBC 9.2 2021    WBC 9.4 2020    HGB 12.4 2021    HGB 13.3 2021    HCT 40.8 2021    HCT 36.8 2020     2021     2020     BMP:   Lab Results   Component Value Date     2021    POTASSIUM 3.9 2021    CHLORIDE 109 2021    CO2 21 2021    BUN 5 (L) 2021    CR 0.56 2021    GLC 96 2021     COAGS: No results found  for: PTT, INR, FIBR  POC:   Lab Results   Component Value Date    BGM 85 01/04/2021     HEPATIC:   Lab Results   Component Value Date    ALT 11 01/03/2021    AST 12 01/03/2021     OTHER:   Lab Results   Component Value Date    RICHARD 9.2 01/03/2021       Anesthesia Plan     History & Physical Review      ASA Status:  2.   Plan for General with Intravenous induction. Maintenance will be Balanced.         PONV prophylaxis:  Ondansetron (or other 5HT-3) and Dexamethasone or Solumedrol.    Comments: Pt breastfeeding, avoid ketamine and Demerol; plan to limit Dilaudid and morphine.       Consents  Anesthesia Plan(s) and associated risks, benefits, and realistic alternatives discussed.    Questions answered and patient/representative(s) expressed understanding.    Discussed with:  Patient.             Postoperative Care  Postoperative pain management: IV analgesics and Multi-modal analgesia.           Nolan Daugherty MD

## 2021-02-02 NOTE — OP NOTE
Procedure Date: 2021      PREOPERATIVE DIAGNOSIS:  Retained products of conception.      POSTOPERATIVE DIAGNOSIS:  Retained products of conception.      PROCEDURE:  Dilation and curettage with ultrasound guidance, hysteroscopy.      SURGEON:  Puja Downing MD      ANESTHESIA:  General.      ESTIMATED BLOOD LOSS:  40 mL      FINDINGS:  There was a large area of echogenic material in the uterine cavity that was evacuated with a ring forceps, consistent with a 2 cm piece of placenta.  The patient was given 1 gram of tranexamic acid and 600 mcg of Cytotec rectally following the procedure.      HISTORY:  The patient is a 25-year-old  2, para 2, who is 4 weeks status post vaginal delivery, who presents with acute onset of heavy bleeding.  An ultrasound demonstrates an area concerning for retained products of conception.  She was counseled on her options at this time and offered dilation and curettage with possible hysteroscopy and MyoSure resection.  Risks, benefits and alternatives were discussed.  She was started on a course of Augmentin and she was given Ancef preoperatively.      PROCEDURE:  After obtaining informed consent, the patient was taken to the operating room where she was prepped and draped in the usual sterile fashion and placed in the dorsal lithotomy position.  Exam under anesthesia revealed a cervix that was dilated to the fingertip and was otherwise normal.  A sterile speculum was placed in the vagina and the anterior lip of the cervix was grasped with a single-tooth tenaculum.  The cervix was gently dilated up to a #10 Hegar dilator.  A transabdominal ultrasound was then performed.  It was notable that in the lower uterine segment there was a large area of echogenic tissue.  A ring forceps was then advanced into the endometrium and this tissue was then evacuated.  It appeared to be a 2 cm piece of placenta.  At this time, the #10 curved suction curette was used to advance into the uterine  cavity and a suction curette was then gently performed until a gritty texture was noted on all aspects.  Ultrasound confirmed the cavity appeared collapsed and thin.        At this time, hysteroscopy was attempted to be performed.  The MyoSure hysteroscope was introduced and the uterus was insufflated with saline.  The lower uterine segment appeared consistent with a recent curettage.  There were blood clots that were flushed out.  No obvious areas of tissue were observed; however, visualization was limited due to blood.  The hysteroscope was then removed and 1 more pass with the suction curette was performed.  A large portion of the hysteroscopic fluid had spilled onto the floor.  Overall the estimated fluid deficit was 290 mL.  At this time, there was noted to be some active red blood dripping from the cervix.  The patient was administered 1 g of IV tranexamic acid and 600 mcg of rectal Cytotec at this time.  Her bleeding subsequently stabilized.  The instruments were removed.        The patient tolerated the procedure without difficulty and she was taken to the recovery room in stable condition.  Sponge, lap and needle counts were correct.         SANTINO ESTRELLA MD             D: 2021   T: 2021   MT: KRISTIN      Name:     ALAYNA BUTLER   MRN:      [id number]        Account:        [id number]   :      1995           Procedure Date: 2021      Document: [id number]

## 2021-02-02 NOTE — DISCHARGE INSTRUCTIONS
Same Day Surgery Discharge Instructions for  Sedation and General Anesthesia       It's not unusual to feel dizzy, light-headed or faint for up to 24 hours after surgery or while taking pain medication.  If you have these symptoms: sit for a few minutes before standing and have someone assist you when you get up to walk or use the bathroom.      You should rest and relax for the next 24 hours. We recommend you make arrangements to have an adult stay with you for at least 24 hours after your discharge.  Avoid hazardous and strenuous activity.      DO NOT DRIVE any vehicle or operate mechanical equipment for 24 hours following the end of your surgery.  Even though you may feel normal, your reactions may be affected by the medication you have received.      Do not drink alcoholic beverages for 24 hours following surgery.       Slowly progress to your regular diet as you feel able. It's not unusual to feel nauseated and/or vomit after receiving anesthesia.  If you develop these symptoms, drink clear liquids (apple juice, ginger ale, broth, 7-up, etc. ) until you feel better.  If your nausea and vomiting persists for 24 hours, please notify your surgeon.        All narcotic pain medications, along with inactivity and anesthesia, can cause constipation. Drinking plenty of liquids and increasing fiber intake will help.      For any questions of a medical nature, call your surgeon.      Do not make important decisions for 24 hours.      If you had general anesthesia, you may have a sore throat for a couple of days related to the breathing tube used during surgery.  You may use Cepacol lozenges to help with this discomfort.  If it worsens or if you develop a fever, contact your surgeon.       If you feel your pain is not well managed with the pain medications prescribed by your surgeon, please contact your surgeon's office to let them know so they can address your concerns.       CoVid 19 Information    We want to give you  information regarding Covid. Please consult your primary care provider with any questions you might have.     Patient who have symptoms (cough, fever, or shortness of breath), need to isolate for 7 days from when symptoms started OR 72 hours after fever resolves (without fever reducing medications) AND improvement of respiratory symptoms (whichever is longer).      Isolate yourself at home (in own room/own bathroom if possible)    Do Not allow any visitors    Do Not go to work or school    Do Not go to [place],  centers, shopping, or other public places.    Do Not shake hands.    Avoid close and intimate contact with others (hugging, kissing).    Follow CDC recommendations for household cleaning of frequently touched services.     After the initial 7 days, continue to isolate yourself from household members as much as possible. To continue decrease the risk of community spread and exposure, you and any members of your household should limit activities in public for 14 days after starting home isolation.     You can reference the following CDC link for helpful home isolation/care tips:  https://www.cdc.gov/coronavirus/2019-ncov/downloads/10Things.pdf    Protect Others:    Cover Your Mouth and Nose with a mask, disposable tissue or wash cloth to avoid spreading germs to others.    Wash your hands and face frequently with soap and water    Call Your Primary Doctor If: Breathing difficulty develops or you become worse.    For more information about COVID19 and options for caring for yourself at home, please visit the CDC website at https://www.cdc.gov/coronavirus/2019-ncov/about/steps-when-sick.html  For more options for care at [facility], please visit our website at [email or web address]        **Per the anesthesiologist, you may resume breast feeding right away**    **IF YOU CONTINUE TO HAVE BLEEDING AT HOME, TAKE THE METHERGINE PILL THAT WAS PREVIOUSLY PRESCRIBED PER   CHO***        Discharge Instructions for D&C    Activity   You may resume normal activities including lifting.  It is permissible to climb stairs. You may drive a car in 24 hours unless you are taking a narcotic pain medication.   Baths or showers are acceptable.  Avoid tampons and intercourse for 4 weeks.  You may return to work in two to three days.     Office Visit   Make an appointment for approximately four weeks from the day of surgery, unless directed otherwise.     Vaginal Drainage   You may have some bleeding or vaginal discharge for about one week; occasionally, the spotting may last until 14 days after surgery.      Temperature   If you develop temperature elevations to 101 F or over, please call the office.     Tissue Reports   Please allow at least TWO full working days before final reports are available.  Therefore,call us on the third day or later,at your convenience, for this report.    [facility]  Marge Joseph M.D.  WILBER Briseno M.D.  WILBER Rodríguez M.D.  Aaron Mcclendon M.D.  Josette Sprague M.D.    [address]                                   [address], [place]  [phone]

## 2021-02-02 NOTE — OR NURSING
Dr Downing informed of pt's condition.  Pt to pump, if pt continues to do well( no bleeding, VSS) may progress to phase 2.  If pt starts to bleed while at home, pt to take previously prescribed Methergine.

## 2021-02-02 NOTE — ANESTHESIA CARE TRANSFER NOTE
Patient: Dulce Maria A Riddering    Procedure(s):  DILATION AND CURETTAGE, UTERUS, USING SUCTION, WITH ULTRASOUND GUIDANCE, HYSTEROSCOPY    Diagnosis: Retained products of conception with hemorrhage [O72.2]  Diagnosis Additional Information: No value filed.    Anesthesia Type:   General     Note:    Oropharynx: oropharynx clear of all foreign objects and spontaneously breathing  Level of Consciousness: drowsy  Oxygen Supplementation: face mask  Level of Supplemental Oxygen: 6  Independent Airway: airway patency satisfactory and stable  Dentition: dentition unchanged  Vital Signs Stable: post-procedure vital signs reviewed and stable  Report to RN Given: handoff report given  Patient transferred to: PACU  Comments: At end of procedure, spontaneous respirations, adequate tidal volumes, followed commands to voice, LMA removed atraumatically, oropharynx suctioned, airway patent after LMA removal. Oxygen via facemask at 6 liters per minute to PACU. Oxygen tubing connected to wall O2 in PACU, SpO2, NiBP, and EKG monitors and alarms on and functioning, Humera Hugger warmer connected to patient gown, report on patient's clinical status given to PACU RN, RN questions answered.  Handoff Report: Identifed the Patient, Identified the Reponsible Provider, Reviewed the pertinent medical history, Discussed the surgical course, Reviewed Intra-OP anesthesia mangement and issues during anesthesia, Set expectations for post-procedure period and Allowed opportunity for questions and acknowledgement of understanding      Vitals: (Last set prior to Anesthesia Care Transfer)  CRNA VITALS  2/2/2021 0755 - 2/2/2021 0832      2/2/2021             SpO2:  100 %    Resp Rate (set):  10        Electronically Signed By: FRANKO Plunkett CRNA  February 2, 2021  8:32 AM

## 2021-02-03 LAB — COPATH REPORT: NORMAL

## 2021-09-19 ENCOUNTER — HEALTH MAINTENANCE LETTER (OUTPATIENT)
Age: 26
End: 2021-09-19

## 2022-01-09 ENCOUNTER — HEALTH MAINTENANCE LETTER (OUTPATIENT)
Age: 27
End: 2022-01-09

## 2022-11-21 ENCOUNTER — HEALTH MAINTENANCE LETTER (OUTPATIENT)
Age: 27
End: 2022-11-21

## 2024-05-04 NOTE — PLAN OF CARE
Ate a regular diet and tolerated it well. Also tolerating fluids. LR at 125 as ordered. Up with assistance and ambulated without difficulty. Tried to void when up but was unable. Stated that she felt lower uterine discomfort and a popping sensation. Dr. Larson informed. Tylenol ordered and plan is to give and attempt BR to void a little later. No bleeding nor SROM/leaking. VSS. Has felt a little cramping.   x 2 trials with seated rest break/25 feet

## 2024-08-16 ENCOUNTER — APPOINTMENT (OUTPATIENT)
Dept: URBAN - METROPOLITAN AREA CLINIC 253 | Age: 29
Setting detail: DERMATOLOGY
End: 2024-08-16

## 2024-08-16 VITALS — HEIGHT: 65 IN | WEIGHT: 240 LBS

## 2024-08-16 DIAGNOSIS — R21 RASH AND OTHER NONSPECIFIC SKIN ERUPTION: ICD-10-CM

## 2024-08-16 PROCEDURE — 99203 OFFICE O/P NEW LOW 30 MIN: CPT

## 2024-08-16 PROCEDURE — OTHER PRESCRIPTION MEDICATION MANAGEMENT: OTHER

## 2024-08-16 PROCEDURE — OTHER MIPS QUALITY: OTHER

## 2024-08-16 PROCEDURE — OTHER PRESCRIPTION: OTHER

## 2024-08-16 PROCEDURE — OTHER COUNSELING: OTHER

## 2024-08-16 RX ORDER — TRIAMCINOLONE ACETONIDE 1 MG/G
0.1% CREAM TOPICAL BID
Qty: 80 | Refills: 2 | Status: ERX | COMMUNITY
Start: 2024-08-16

## 2024-08-16 ASSESSMENT — LOCATION ZONE DERM
LOCATION ZONE: TRUNK
LOCATION ZONE: LEG
LOCATION ZONE: ARM

## 2024-08-16 ASSESSMENT — LOCATION DETAILED DESCRIPTION DERM
LOCATION DETAILED: LEFT ANTERIOR PROXIMAL THIGH
LOCATION DETAILED: RIGHT MEDIAL SUPERIOR CHEST
LOCATION DETAILED: RIGHT PROXIMAL DORSAL FOREARM
LOCATION DETAILED: LEFT PROXIMAL DORSAL FOREARM
LOCATION DETAILED: RIGHT ANTERIOR PROXIMAL THIGH

## 2024-08-16 ASSESSMENT — LOCATION SIMPLE DESCRIPTION DERM
LOCATION SIMPLE: CHEST
LOCATION SIMPLE: RIGHT THIGH
LOCATION SIMPLE: LEFT FOREARM
LOCATION SIMPLE: LEFT THIGH
LOCATION SIMPLE: RIGHT FOREARM

## 2024-08-16 NOTE — PROCEDURE: PRESCRIPTION MEDICATION MANAGEMENT
Initiate Treatment: triamcinolone acetonide 0.1 % topical cream BID: Apply twice daily to rash on body for up to 2 weeks/month as needed.
Detail Level: Zone
Plan: Discussed that rash is either post viral or folliculitis. If triamcinolone makes rash worse it would likely not be folliculitis and she should discontinue. She was advised to seek medical attention of rash or any of her symptoms get worse.
Render In Strict Bullet Format?: No

## 2024-08-16 NOTE — HPI: RASH
What Type Of Note Output Would You Prefer (Optional)?: Standard Output
Is This A New Presentation, Or A Follow-Up?: Rash
Additional History: Achy neck and swallow lymph nodes as well on neck in addition to the red bumps all over body.

## (undated) DEVICE — GLOVE PROTEXIS BLUE W/NEU-THERA 6.0  2D73EB60

## (undated) DEVICE — SU ETHILON 2 CT-2 30" D-6865

## (undated) DEVICE — DRAPE GYN/UROLOGY FLUID POUCH TUR 29455

## (undated) DEVICE — DRAPE POUCH IRR 1016

## (undated) DEVICE — SOL NACL 0.9% IRRIG 1000ML BOTTLE 2F7124

## (undated) DEVICE — SEAL SET MYOSURE ROD LENS SCOPE SINGLE USE 40-902

## (undated) DEVICE — TUBING VACUUM COLLECTION 6FT 23116

## (undated) DEVICE — LIGHT HANDLE X2

## (undated) DEVICE — SYR BULB IRRIG 50ML LATEX FREE 0035280

## (undated) DEVICE — SUCTION CANNULA UTERINE 10MM CVD  21553

## (undated) DEVICE — GLOVE PROTEXIS W/NEU-THERA 6.5  2D73TE65

## (undated) DEVICE — Device

## (undated) DEVICE — PACK TVT HYSTEROSCOPY SMA15HYFSE

## (undated) DEVICE — PREP POVIDONE IODINE USP 10% TOPICAL SOL 64537161

## (undated) DEVICE — LINEN TOWEL PACK X5 5464

## (undated) DEVICE — SOL NACL 0.9% IRRIG 3000ML BAG 2B7477

## (undated) DEVICE — TUBING SUCTION 10'X3/16" N510

## (undated) DEVICE — LUBRICATING JELLY 2.7GM T00137

## (undated) DEVICE — SPONGE RAY-TEC 4X8" 7318

## (undated) DEVICE — SOL WATER IRRIG 1000ML BOTTLE 2F7114

## (undated) DEVICE — PANTIES MESH LG/XLG 2PK 706M2

## (undated) DEVICE — PREP POVIDONE IODINE USP 7.5% CLEANING SOL 64538161

## (undated) DEVICE — SOL WATER IRRIG 1000ML BOTTLE 07139-09

## (undated) DEVICE — KIT PROCEDURE FLUENT IN/OUT FLOWPAK TISS TRAP FLT-112S

## (undated) DEVICE — GLOVE ESTEEM POWDER FREE SMT 6.0  2D72PT60

## (undated) DEVICE — PREP SKIN SCRUB TRAY 4461A

## (undated) DEVICE — SUCTION TIP YANKAUER W/O VENT K86

## (undated) DEVICE — NDL COUNTER 20CT 31142493

## (undated) DEVICE — CATH TRAY FOLEY 16FR BARDEX W/DRAIN BAG STATLOCK 300316A

## (undated) RX ORDER — CEFAZOLIN SODIUM 2 G/100ML
INJECTION, SOLUTION INTRAVENOUS
Status: DISPENSED
Start: 2021-02-02

## (undated) RX ORDER — DEXAMETHASONE SODIUM PHOSPHATE 4 MG/ML
INJECTION, SOLUTION INTRA-ARTICULAR; INTRALESIONAL; INTRAMUSCULAR; INTRAVENOUS; SOFT TISSUE
Status: DISPENSED
Start: 2021-02-02

## (undated) RX ORDER — FENTANYL CITRATE 0.05 MG/ML
INJECTION, SOLUTION INTRAMUSCULAR; INTRAVENOUS
Status: DISPENSED
Start: 2021-02-02

## (undated) RX ORDER — ONDANSETRON 2 MG/ML
INJECTION INTRAMUSCULAR; INTRAVENOUS
Status: DISPENSED
Start: 2021-02-02

## (undated) RX ORDER — FENTANYL CITRATE 50 UG/ML
INJECTION, SOLUTION INTRAMUSCULAR; INTRAVENOUS
Status: DISPENSED
Start: 2021-02-02

## (undated) RX ORDER — GLYCOPYRROLATE 0.2 MG/ML
INJECTION, SOLUTION INTRAMUSCULAR; INTRAVENOUS
Status: DISPENSED
Start: 2021-02-02

## (undated) RX ORDER — PROPOFOL 10 MG/ML
INJECTION, EMULSION INTRAVENOUS
Status: DISPENSED
Start: 2021-02-02

## (undated) RX ORDER — LIDOCAINE HYDROCHLORIDE 20 MG/ML
INJECTION, SOLUTION EPIDURAL; INFILTRATION; INTRACAUDAL; PERINEURAL
Status: DISPENSED
Start: 2021-02-02